# Patient Record
Sex: MALE | Race: WHITE | NOT HISPANIC OR LATINO | URBAN - METROPOLITAN AREA
[De-identification: names, ages, dates, MRNs, and addresses within clinical notes are randomized per-mention and may not be internally consistent; named-entity substitution may affect disease eponyms.]

---

## 2018-02-26 ENCOUNTER — OFFICE VISIT CONVERTED (OUTPATIENT)
Dept: FAMILY MEDICINE CLINIC | Facility: CLINIC | Age: 48
End: 2018-02-26
Attending: NURSE PRACTITIONER

## 2018-05-29 ENCOUNTER — OFFICE VISIT CONVERTED (OUTPATIENT)
Dept: FAMILY MEDICINE CLINIC | Facility: CLINIC | Age: 48
End: 2018-05-29
Attending: NURSE PRACTITIONER

## 2018-09-25 ENCOUNTER — OFFICE VISIT CONVERTED (OUTPATIENT)
Dept: FAMILY MEDICINE CLINIC | Facility: CLINIC | Age: 48
End: 2018-09-25
Attending: PHYSICIAN ASSISTANT

## 2018-09-27 ENCOUNTER — OFFICE VISIT CONVERTED (OUTPATIENT)
Dept: PODIATRY | Facility: CLINIC | Age: 48
End: 2018-09-27
Attending: PODIATRIST

## 2018-09-27 ENCOUNTER — CONVERSION ENCOUNTER (OUTPATIENT)
Dept: PODIATRY | Facility: CLINIC | Age: 48
End: 2018-09-27

## 2019-09-05 ENCOUNTER — HOSPITAL ENCOUNTER (OUTPATIENT)
Dept: LAB | Facility: HOSPITAL | Age: 49
Discharge: HOME | End: 2019-09-05
Attending: NURSE PRACTITIONER

## 2019-09-05 ENCOUNTER — HOSPITAL ENCOUNTER (OUTPATIENT)
Dept: GENERAL RADIOLOGY | Facility: HOSPITAL | Age: 49
Discharge: HOME | End: 2019-09-05
Attending: NURSE PRACTITIONER

## 2019-09-05 LAB
T4 FREE SERPL-MCNC: 1.1 NG/DL (ref 0.9–1.8)
TSH SERPL-ACNC: 1.97 M[IU]/L (ref 0.27–4.2)

## 2019-09-06 ENCOUNTER — HOSPITAL ENCOUNTER (OUTPATIENT)
Dept: GENERAL RADIOLOGY | Facility: HOSPITAL | Age: 49
Discharge: HOME | End: 2019-09-06
Attending: NURSE PRACTITIONER

## 2019-09-06 ENCOUNTER — OFFICE VISIT CONVERTED (OUTPATIENT)
Dept: FAMILY MEDICINE CLINIC | Facility: CLINIC | Age: 49
End: 2019-09-06
Attending: NURSE PRACTITIONER

## 2019-09-06 LAB
ALBUMIN SERPL-MCNC: 4.7 G/DL (ref 3.5–5)
ALBUMIN/GLOB SERPL: 1.6 {RATIO} (ref 1.4–2.6)
ALP SERPL-CCNC: 80 U/L (ref 53–128)
ALT SERPL-CCNC: 112 U/L (ref 10–40)
ANION GAP SERPL CALC-SCNC: 24 MMOL/L (ref 8–19)
AST SERPL-CCNC: 39 U/L (ref 15–50)
BILIRUB SERPL-MCNC: 0.58 MG/DL (ref 0.2–1.3)
BUN SERPL-MCNC: 18 MG/DL (ref 5–25)
BUN/CREAT SERPL: 21 {RATIO} (ref 6–20)
CALCIUM SERPL-MCNC: 9.2 MG/DL (ref 8.7–10.4)
CHLORIDE SERPL-SCNC: 101 MMOL/L (ref 99–111)
CHOLEST SERPL-MCNC: 201 MG/DL (ref 107–200)
CHOLEST/HDLC SERPL: 5.4 {RATIO} (ref 3–6)
CONV CO2: 19 MMOL/L (ref 22–32)
CONV TOTAL PROTEIN: 7.6 G/DL (ref 6.3–8.2)
CREAT UR-MCNC: 0.85 MG/DL (ref 0.7–1.2)
GFR SERPLBLD BASED ON 1.73 SQ M-ARVRAT: >60 ML/MIN/{1.73_M2}
GLOBULIN UR ELPH-MCNC: 2.9 G/DL (ref 2–3.5)
GLUCOSE SERPL-MCNC: 135 MG/DL (ref 70–99)
HDLC SERPL-MCNC: 37 MG/DL (ref 40–60)
LDLC SERPL CALC-MCNC: 145 MG/DL (ref 70–100)
OSMOLALITY SERPL CALC.SUM OF ELEC: 294 MOSM/KG (ref 273–304)
POTASSIUM SERPL-SCNC: 4.2 MMOL/L (ref 3.5–5.3)
PSA SERPL-MCNC: 0.55 NG/ML (ref 0–4)
SODIUM SERPL-SCNC: 140 MMOL/L (ref 135–147)
TRIGL SERPL-MCNC: 93 MG/DL (ref 40–150)
VLDLC SERPL-MCNC: 19 MG/DL (ref 5–37)

## 2019-09-09 ENCOUNTER — HOSPITAL ENCOUNTER (OUTPATIENT)
Dept: ULTRASOUND IMAGING | Facility: HOSPITAL | Age: 49
Discharge: HOME | End: 2019-09-09
Attending: NURSE PRACTITIONER

## 2019-10-18 ENCOUNTER — OFFICE VISIT CONVERTED (OUTPATIENT)
Dept: FAMILY MEDICINE CLINIC | Facility: CLINIC | Age: 49
End: 2019-10-18
Attending: NURSE PRACTITIONER

## 2020-02-10 ENCOUNTER — OFFICE VISIT CONVERTED (OUTPATIENT)
Dept: CARDIOLOGY | Facility: CLINIC | Age: 50
End: 2020-02-10
Attending: INTERNAL MEDICINE

## 2020-02-14 ENCOUNTER — CONVERSION ENCOUNTER (OUTPATIENT)
Dept: CARDIOLOGY | Facility: CLINIC | Age: 50
End: 2020-02-14
Attending: INTERNAL MEDICINE

## 2020-10-01 ENCOUNTER — OFFICE VISIT CONVERTED (OUTPATIENT)
Dept: FAMILY MEDICINE CLINIC | Facility: CLINIC | Age: 50
End: 2020-10-01
Attending: PHYSICIAN ASSISTANT

## 2020-10-01 ENCOUNTER — HOSPITAL ENCOUNTER (OUTPATIENT)
Dept: LAB | Facility: HOSPITAL | Age: 50
Discharge: HOME | End: 2020-10-01
Attending: PHYSICIAN ASSISTANT

## 2020-10-01 LAB
ALBUMIN SERPL-MCNC: 4.5 G/DL (ref 3.5–5)
ALBUMIN/GLOB SERPL: 1.8 {RATIO} (ref 1.4–2.6)
ALP SERPL-CCNC: 78 U/L (ref 56–119)
ALT SERPL-CCNC: 107 U/L (ref 10–40)
ANION GAP SERPL CALC-SCNC: 22 MMOL/L (ref 8–19)
AST SERPL-CCNC: 41 U/L (ref 15–50)
BASOPHILS # BLD AUTO: 0.02 10*3/UL (ref 0–0.2)
BASOPHILS NFR BLD AUTO: 0.3 % (ref 0–3)
BILIRUB SERPL-MCNC: 0.42 MG/DL (ref 0.2–1.3)
BUN SERPL-MCNC: 20 MG/DL (ref 5–25)
BUN/CREAT SERPL: 20 {RATIO} (ref 6–20)
CALCIUM SERPL-MCNC: 8.9 MG/DL (ref 8.7–10.4)
CHLORIDE SERPL-SCNC: 106 MMOL/L (ref 99–111)
CHOLEST SERPL-MCNC: 199 MG/DL (ref 107–200)
CHOLEST/HDLC SERPL: 5.7 {RATIO} (ref 3–6)
CONV ABS IMM GRAN: 0.04 10*3/UL (ref 0–0.2)
CONV CO2: 18 MMOL/L (ref 22–32)
CONV IMMATURE GRAN: 0.5 % (ref 0–1.8)
CONV TOTAL PROTEIN: 7 G/DL (ref 6.3–8.2)
CREAT UR-MCNC: 0.98 MG/DL (ref 0.7–1.2)
DEPRECATED RDW RBC AUTO: 40.8 FL (ref 35.1–43.9)
EOSINOPHIL # BLD AUTO: 0.12 10*3/UL (ref 0–0.7)
EOSINOPHIL # BLD AUTO: 1.6 % (ref 0–7)
ERYTHROCYTE [DISTWIDTH] IN BLOOD BY AUTOMATED COUNT: 12.6 % (ref 11.6–14.4)
FOLATE SERPL-MCNC: >20 NG/ML (ref 4.8–20)
GFR SERPLBLD BASED ON 1.73 SQ M-ARVRAT: >60 ML/MIN/{1.73_M2}
GLOBULIN UR ELPH-MCNC: 2.5 G/DL (ref 2–3.5)
GLUCOSE SERPL-MCNC: 110 MG/DL (ref 70–99)
HCT VFR BLD AUTO: 48.2 % (ref 42–52)
HDLC SERPL-MCNC: 35 MG/DL (ref 40–60)
HGB BLD-MCNC: 16.2 G/DL (ref 14–18)
LDLC SERPL CALC-MCNC: 129 MG/DL (ref 70–100)
LYMPHOCYTES # BLD AUTO: 1.73 10*3/UL (ref 1–5)
LYMPHOCYTES NFR BLD AUTO: 23.3 % (ref 20–45)
MCH RBC QN AUTO: 29.6 PG (ref 27–31)
MCHC RBC AUTO-ENTMCNC: 33.6 G/DL (ref 33–37)
MCV RBC AUTO: 88.1 FL (ref 80–96)
MONOCYTES # BLD AUTO: 0.62 10*3/UL (ref 0.2–1.2)
MONOCYTES NFR BLD AUTO: 8.4 % (ref 3–10)
NEUTROPHILS # BLD AUTO: 4.88 10*3/UL (ref 2–8)
NEUTROPHILS NFR BLD AUTO: 65.9 % (ref 30–85)
NRBC CBCN: 0 % (ref 0–0.7)
OSMOLALITY SERPL CALC.SUM OF ELEC: 297 MOSM/KG (ref 273–304)
PLATELET # BLD AUTO: 242 10*3/UL (ref 130–400)
PMV BLD AUTO: 10.2 FL (ref 9.4–12.4)
POTASSIUM SERPL-SCNC: 4 MMOL/L (ref 3.5–5.3)
PSA SERPL-MCNC: 0.68 NG/ML (ref 0–4)
RBC # BLD AUTO: 5.47 10*6/UL (ref 4.7–6.1)
SODIUM SERPL-SCNC: 142 MMOL/L (ref 135–147)
TRIGL SERPL-MCNC: 173 MG/DL (ref 40–150)
TSH SERPL-ACNC: 1.27 M[IU]/L (ref 0.27–4.2)
VIT B12 SERPL-MCNC: 589 PG/ML (ref 211–911)
VLDLC SERPL-MCNC: 35 MG/DL (ref 5–37)
WBC # BLD AUTO: 7.41 10*3/UL (ref 4.8–10.8)

## 2020-11-02 ENCOUNTER — HOSPITAL ENCOUNTER (OUTPATIENT)
Dept: LAB | Facility: HOSPITAL | Age: 50
Discharge: HOME | End: 2020-11-02
Attending: PHYSICIAN ASSISTANT

## 2020-11-02 ENCOUNTER — OFFICE VISIT CONVERTED (OUTPATIENT)
Dept: FAMILY MEDICINE CLINIC | Facility: CLINIC | Age: 50
End: 2020-11-02
Attending: PHYSICIAN ASSISTANT

## 2020-11-02 ENCOUNTER — CONVERSION ENCOUNTER (OUTPATIENT)
Dept: FAMILY MEDICINE CLINIC | Facility: CLINIC | Age: 50
End: 2020-11-02

## 2020-11-02 LAB
ALBUMIN SERPL-MCNC: 4.3 G/DL (ref 3.5–5)
ALBUMIN/GLOB SERPL: 1.7 {RATIO} (ref 1.4–2.6)
ALP SERPL-CCNC: 79 U/L (ref 56–119)
ALT SERPL-CCNC: 120 U/L (ref 10–40)
ANION GAP SERPL CALC-SCNC: 16 MMOL/L (ref 8–19)
AST SERPL-CCNC: 46 U/L (ref 15–50)
BILIRUB SERPL-MCNC: 0.39 MG/DL (ref 0.2–1.3)
BUN SERPL-MCNC: 17 MG/DL (ref 5–25)
BUN/CREAT SERPL: 20 {RATIO} (ref 6–20)
CALCIUM SERPL-MCNC: 8.7 MG/DL (ref 8.7–10.4)
CHLORIDE SERPL-SCNC: 105 MMOL/L (ref 99–111)
CONV CO2: 22 MMOL/L (ref 22–32)
CONV TOTAL PROTEIN: 6.8 G/DL (ref 6.3–8.2)
CREAT UR-MCNC: 0.86 MG/DL (ref 0.7–1.2)
EST. AVERAGE GLUCOSE BLD GHB EST-MCNC: 131 MG/DL
GFR SERPLBLD BASED ON 1.73 SQ M-ARVRAT: >60 ML/MIN/{1.73_M2}
GLOBULIN UR ELPH-MCNC: 2.5 G/DL (ref 2–3.5)
GLUCOSE SERPL-MCNC: 107 MG/DL (ref 70–99)
HBA1C MFR BLD: 6.2 % (ref 3.5–5.7)
OSMOLALITY SERPL CALC.SUM OF ELEC: 290 MOSM/KG (ref 273–304)
POTASSIUM SERPL-SCNC: 4.2 MMOL/L (ref 3.5–5.3)
SODIUM SERPL-SCNC: 139 MMOL/L (ref 135–147)

## 2020-11-11 ENCOUNTER — OFFICE VISIT CONVERTED (OUTPATIENT)
Dept: PODIATRY | Facility: CLINIC | Age: 50
End: 2020-11-11
Attending: PODIATRIST

## 2020-12-18 ENCOUNTER — OFFICE VISIT CONVERTED (OUTPATIENT)
Dept: PODIATRY | Facility: CLINIC | Age: 50
End: 2020-12-18
Attending: PODIATRIST

## 2021-01-06 ENCOUNTER — OFFICE VISIT CONVERTED (OUTPATIENT)
Dept: SURGERY | Facility: CLINIC | Age: 51
End: 2021-01-06
Attending: NURSE PRACTITIONER

## 2021-01-29 ENCOUNTER — HOSPITAL ENCOUNTER (OUTPATIENT)
Dept: LAB | Facility: HOSPITAL | Age: 51
Discharge: HOME | End: 2021-01-29
Attending: PSYCHIATRY & NEUROLOGY

## 2021-01-29 ENCOUNTER — OFFICE VISIT CONVERTED (OUTPATIENT)
Dept: NEUROLOGY | Facility: CLINIC | Age: 51
End: 2021-01-29
Attending: PSYCHIATRY & NEUROLOGY

## 2021-01-29 LAB
ALBUMIN SERPL-MCNC: 4.4 G/DL (ref 3.5–5)
ALBUMIN/GLOB SERPL: 1.6 {RATIO} (ref 1.4–2.6)
ALP SERPL-CCNC: 81 U/L (ref 56–119)
ALT SERPL-CCNC: 66 U/L (ref 10–40)
ANION GAP SERPL CALC-SCNC: 15 MMOL/L (ref 8–19)
AST SERPL-CCNC: 25 U/L (ref 15–50)
BILIRUB SERPL-MCNC: 0.44 MG/DL (ref 0.2–1.3)
BUN SERPL-MCNC: 17 MG/DL (ref 5–25)
BUN/CREAT SERPL: 19 {RATIO} (ref 6–20)
CALCIUM SERPL-MCNC: 9.1 MG/DL (ref 8.7–10.4)
CHLORIDE SERPL-SCNC: 103 MMOL/L (ref 99–111)
CONV CO2: 24 MMOL/L (ref 22–32)
CONV TOTAL PROTEIN: 7.1 G/DL (ref 6.3–8.2)
CREAT UR-MCNC: 0.91 MG/DL (ref 0.7–1.2)
ERYTHROCYTE [SEDIMENTATION RATE] IN BLOOD: 3 MM/H (ref 0–20)
GFR SERPLBLD BASED ON 1.73 SQ M-ARVRAT: >60 ML/MIN/{1.73_M2}
GLOBULIN UR ELPH-MCNC: 2.7 G/DL (ref 2–3.5)
GLUCOSE SERPL-MCNC: 90 MG/DL (ref 70–99)
OSMOLALITY SERPL CALC.SUM OF ELEC: 287 MOSM/KG (ref 273–304)
POTASSIUM SERPL-SCNC: 3.7 MMOL/L (ref 3.5–5.3)
SODIUM SERPL-SCNC: 138 MMOL/L (ref 135–147)

## 2021-01-30 LAB
EST. AVERAGE GLUCOSE BLD GHB EST-MCNC: 111 MG/DL
HBA1C MFR BLD: 5.5 % (ref 3.5–5.7)

## 2021-02-01 LAB
ALBUMIN SERPL-MCNC: 3.5 G/DL (ref 2.9–4.4)
ALBUMIN/GLOB SERPL: 1.2 {RATIO} (ref 0.7–1.7)
ALPHA2 GLOB SERPL ELPH-MCNC: 0.8 G/DL (ref 0.4–1)
BETA GLOBULIN: 1.2 G/DL (ref 0.7–1.3)
CONV ALPHA-1-GLOBULIN: 0.2 G/DL (ref 0–0.4)
CONV HEPATITIS B SURFACE AG W CONFIRMATION RE: NEGATIVE
CONV HEPATITIS COMMENT: NORMAL
CONV IMMUNOGLOBULIN G (IGG): 774 MG/DL (ref 603–1613)
CONV IMMUNOGLOBULIN M (IGM): 48 MG/DL (ref 20–172)
CONV PE INTERPRETATION: NORMAL
CONV PE NOTE: NORMAL
CONV RHEUMATOID FACTOR IGA: 1 UNITS (ref 0–6)
CONV RHEUMATOID FACTOR IGG: 3 UNITS (ref 0–6)
CONV RHEUMATOID FACTOR IGM: 0 UNITS (ref 0–6)
CONV TOTAL PROTEIN: 6.5 G/DL (ref 6–8.5)
GAMMA GLOB SERPL ELPH-MCNC: 0.8 G/DL (ref 0.4–1.8)
GLOBULIN UR ELPH-MCNC: 3 G/DL (ref 2.2–3.9)
HBV CORE AB SER DONR QL IA: NEGATIVE
HBV CORE IGM SERPL QL IA: NEGATIVE
HBV E AB SERPL QL IA: NEGATIVE
HBV E AG SERPL QL IA: NEGATIVE
HBV SURFACE AB SER QL: NON REACTIVE
HCV AB S/CO SERPL IA: <0.1 S/CO RATIO (ref 0–0.9)
IGA SERPL-MCNC: 314 MG/DL (ref 90–386)
M-SPIKE: NORMAL G/DL
PROT PATTERN SERPL IFE-IMP: NORMAL

## 2021-02-02 LAB
ARSENIC BLD-MCNC: 7 UG/L (ref 2–23)
LEAD BLD-MCNC: <1 UG/DL (ref 0–4)
MERCURY BLD-MCNC: 1.8 UG/L (ref 0–14.9)

## 2021-02-03 ENCOUNTER — OFFICE VISIT CONVERTED (OUTPATIENT)
Dept: FAMILY MEDICINE CLINIC | Facility: CLINIC | Age: 51
End: 2021-02-03
Attending: PHYSICIAN ASSISTANT

## 2021-02-03 LAB
DSDNA AB SER-ACNC: NEGATIVE [IU]/ML
ENA AB SER IA-ACNC: NEGATIVE {RATIO}

## 2021-02-04 LAB — CONV PARANEOPLASTIC REFLEXIVE PANEL: NORMAL

## 2021-02-16 LAB
B BURGDOR18KD IGG SER QL IB: ABNORMAL
B BURGDOR23KD IGG SER QL IB: ABNORMAL
B BURGDOR23KD IGM SER QL IB: ABNORMAL
B BURGDOR28KD IGG SER QL IB: ABNORMAL
B BURGDOR30KD IGG SER QL IB: ABNORMAL
B BURGDOR39KD IGG SER QL IB: ABNORMAL
B BURGDOR39KD IGM SER QL IB: ABNORMAL
B BURGDOR41KD IGG SER QL IB: PRESENT
B BURGDOR41KD IGM SER QL IB: ABNORMAL
B BURGDOR45KD IGG SER QL IB: ABNORMAL
B BURGDOR58KD IGG SER QL IB: ABNORMAL
B BURGDOR66KD IGG SER QL IB: ABNORMAL
B BURGDOR93KD IGG CSF QL IB: ABNORMAL
CONV LYME IGG LINE BLOT INTERPRETATION: NEGATIVE
CONV LYME IGM LINE BLOT INTERPRETATION: NEGATIVE

## 2021-03-01 ENCOUNTER — HOSPITAL ENCOUNTER (OUTPATIENT)
Dept: GASTROENTEROLOGY | Facility: HOSPITAL | Age: 51
Setting detail: HOSPITAL OUTPATIENT SURGERY
Discharge: HOME | End: 2021-03-01
Attending: SURGERY

## 2021-03-08 ENCOUNTER — OFFICE VISIT CONVERTED (OUTPATIENT)
Dept: NEUROLOGY | Facility: CLINIC | Age: 51
End: 2021-03-08
Attending: PSYCHIATRY & NEUROLOGY

## 2021-03-22 ENCOUNTER — HOSPITAL ENCOUNTER (OUTPATIENT)
Dept: MRI IMAGING | Facility: HOSPITAL | Age: 51
Discharge: HOME | End: 2021-03-22
Attending: PSYCHIATRY & NEUROLOGY

## 2021-04-14 ENCOUNTER — CONVERSION ENCOUNTER (OUTPATIENT)
Dept: NEUROLOGY | Facility: CLINIC | Age: 51
End: 2021-04-14

## 2021-04-14 ENCOUNTER — OFFICE VISIT CONVERTED (OUTPATIENT)
Dept: NEUROLOGY | Facility: CLINIC | Age: 51
End: 2021-04-14
Attending: PSYCHIATRY & NEUROLOGY

## 2021-04-27 ENCOUNTER — HOSPITAL ENCOUNTER (OUTPATIENT)
Dept: CT IMAGING | Facility: HOSPITAL | Age: 51
Discharge: HOME | End: 2021-04-27
Attending: PSYCHIATRY & NEUROLOGY

## 2021-04-27 LAB
CREAT BLD-MCNC: 1 MG/DL (ref 0.6–1.4)
GFR SERPLBLD BASED ON 1.73 SQ M-ARVRAT: >60 ML/MIN/{1.73_M2}

## 2021-05-10 NOTE — H&P
History and Physical      Patient Name: Carlos Mcgregor   Patient ID: [id number]   Sex: Male   YOB: 1970    Primary Care Provider: Dania RATLIFF   Referring Provider: Dania RATLIFF    Visit Date: November 11, 2020    Provider: Ambrosio Haynes DPM   Location: [facility]   Location Address: [address] [place] [address]  [id number]   Location Phone: [phone]          Chief Complaint  · Bilateral Foot Pain      History Of Present Illness  Carlos Mcgregor is a 50 year old /White male who presents to the [facility] today new patient referred from Dania RATLIFF.      New, Established, New Problem:  new  Location:  b/l lower legs  Duration:  2016  Onset:  insidious  Nature:  numbness  Stable, worsening, improving:  slowly worsening    Aggravating factors:   difficulty walking  Previous Treatment:  none    Patient denies any fevers, chills, nausea, vomiting, shortness of breathe, nor any other constitutional signs nor symptoms.         Past Medical History  Allergic rhinitis due to allergen; Arthritis; Elevated glucose; Elevated LFTs; Essential hypertension; Foot pain, left; Foreign body (FB) in soft tissue; GERD; GERD (gastroesophageal reflux disease); Heel pain; Hyperlipidemia; Hypertension; Ingrowing toenail; Numbness in feet         Past Surgical History  *Denies any surgical procedures         Medication List  Cozaar 100 mg oral tablet; montelukast 10 mg oral tablet; omeprazole 20 mg oral capsule,delayed release(DR/EC); Toprol XL 25 mg oral tablet extended release 24 hr         Allergy List  NO KNOWN DRUG ALLERGIES       Allergies Reconciled  Family Medical History  Heart Disease; Diabetes, unspecified type; Hypertension         Social History  Active but no formal exercise; Alcohol (Current some day); ; No known infection risk; Tobacco (Never)         Immunizations  Name Date Admin   Influenza 10/01/2020    Influenza 10/18/2019   Influenza 12/02/2015   Influenza 10/01/2014   Tdap 09/25/2018   Tdap 06/01/2007         Review of Systems  · Constitutional  o Denies  o : fatigue, night sweats  · Eyes  o Denies  o : double vision, blurred vision  · HENT  o Denies  o : vertigo, recent head injury  · Cardiovascular  o Denies  o : chest pain, irregular heart beats  · Respiratory  o Denies  o : shortness of breath, productive cough  · Gastrointestinal  o Denies  o : nausea, vomiting  · Genitourinary  o Denies  o : dysuria, urinary retention  · Integument  o Denies  o : hair growth change, new skin lesions  · Neurologic  o Admits  o : tingling or numbness  o Denies  o : altered mental status, seizures  · Musculoskeletal  o Denies  o : joint swelling, limitation of motion  · Endocrine  o Denies  o : cold intolerance, heat intolerance  · Heme-Lymph  o Denies  o : petechiae, lymph node enlargement or tenderness  · Allergic-Immunologic  o Denies  o : frequent illnesses      Vitals  Date Time BP Position Site L\R Cuff Size HR RR TEMP (F) WT  HT  BMI kg/m2 BSA m2 O2 Sat FR L/min FiO2        11/11/2020 08:15 /86 Sitting    82 - R  97.3 256lbs 0oz 6'   34.72 2.43 94 %            Physical Examination  · Constitutional  o Appearance  o : well developed, well-nourished, no obvious deformities present  · Cardiovascular  o Peripheral Vascular System  o :   § Pedal Pulses  § : pulses 2 + and symmetrical  § Extremities  § : no edema in lower extremities  · Musculoskeletal  o General  o :   § General Musculoskeletal  § : Lower extremity muscle and strength and range of motion is equal and symmetrical bilaterally. The knees are noted to be normal in alignment. Ankle alignment and range of motion is notmral and foot structure is normal. Subtalar, metatarsal and metatarsal-phalangeal range of motion is noted to be within normal limits. The digits of both feet are in normal alignment. The gait is normal.  · Skin and Subcutaneous  Tissue  o General Inspection  o : Skin is noted to have normal texture and turgor, with no excrescences noted.   o Digits and Nails  o : The toenails are noted to be without disese.  · Neurologic  o Sensation  o : Sharp/dull sensation is diminished bilaterally. Monofilament sensation examination of the left foot is diminished. Monofilament sensation examination of the right foot is diminished.          Assessment  · Foot pain, bilateral       Pain in right foot     729.5/M79.671  Pain in left foot     729.5/M79.672  · Idiopathic neuropathy     355.9/G60.9      Plan  · Orders  o EMG/NCV of Lower Extremities Bilaterally (70199) - - 11/11/2020  · Medications  o Medications have been Reconciled  o Transition of Care or Provider Policy  · Instructions  o Discuss Findings: I have discussed the findings of this evaluation with the patient. The discussion included a complete verbal explanation of any changes in the examination results, diagnosis, and the current treatment plan. A schedule for future care needs was explained. If any questions should arise after returning home, I have encouraged the patient to feel free to contact Dr. Hayens. The patient states understanding and agreement with this plan.  o f/u appointment after EMG/NCV results are available.  o Patient to monitor for recurrence of symptoms and to contact Dr. Smith office for a follow-up appointment.  o Electronically Identified Patient Education Materials Provided Electronically            Electronically Signed by: Ambrosio Haynes DPM -Author on November 11, 2020 08:50:12 AM

## 2021-05-10 NOTE — H&P
History and Physical      Patient Name: Carlos Mcgregor   Patient ID: [id number]   Sex: Male   YOB: 1970    Primary Care Provider: Dania RATLIFF   Referring Provider: Dania RATLIFF    Visit Date: January 6, 2021    Provider: JORJE Cho   Location: [facility]   Location Address: [address] [place] [address]  [id number]   Location Phone: [phone]          Chief Complaint  · Requesting colonoscopy  · Age 50 or over  · Here today for a pre-surgical colon screening visit      History Of Present Illness  The patient is a 50 year old /White male presenting to the Surgical Specialist office on a referral from Dania RATLIFF.   Carlos Mcgregor needs to have a screening colonoscopy.   Patient states that they have not had a colonoscopy.   Patient currently complains of: no complaints   Patient Does not have family history of colon cancer.      Presents today on referral from Dania Monet for screening colonoscopy.  Patient denies any abdominal pain, diarrhea, or rectal bleeding.  Denies any family history of colorectal cancer.  No previous colonoscopy.       Past Medical History  Disease Name Date Onset Notes   Allergic rhinitis due to allergen 11/02/2020 --    Arthritis --  --    Elevated glucose 11/02/2020 --    Elevated LFTs --  --    Essential hypertension 11/02/2020 --    Foot pain, left 09/27/2018 --    Foreign body (FB) in soft tissue 09/27/2018 --    GERD --  --    GERD (gastroesophageal reflux disease) 11/02/2020 --    Heel pain --  --    High blood pressure --  --    Hyperlipidemia --  --    Hypertension --  --    Ingrowing toenail --  --    Numbness in feet --  --          Past Surgical History  Procedure Name Date Notes   *Denies any surgical procedures --  --          Medication List  Name Date Started Instructions   Cozaar 100 mg oral tablet 10/01/2020 1 po qd   montelukast 10 mg oral tablet 10/01/2020 TAKE 1  TABLET(10 MG) BY MOUTH EVERY DAY IN THE EVENING for 90 days   omeprazole 20 mg oral capsule,delayed release(/EC) 10/01/2020 take 1 capsule (20 mg) by oral route once daily 30 minutes to 1 hour before a meal for 90 days   Toprol XL 25 mg oral tablet extended release 24 hr 11/02/2020 take 1 tablet (25 mg) by oral route once daily for 90 days         Allergy List  Allergen Name Date Reaction Notes   NO KNOWN DRUG ALLERGIES --  --  --    Latex Exam Gloves --  --  --        Allergies Reconciled  Family Medical History  Disease Name Relative/Age Notes   Heart Disease  --    Diabetes, unspecified type Grandfather (maternal)/  Mother/   Mother; Grandfather (maternal)   Hypertension  --          Social History  Finding Status Start/Stop Quantity Notes   Active but no formal exercise --  --/-- --  --    Alcohol Current some day 19/-- Socially Socially    --  --/-- --  --    No known infection risk --  --/-- --  --    Tobacco Never --/-- --  --          Review of Systems  · Constitutional  o Denies  o : fever, chills  · Eyes  o Denies  o : yellowish discoloration of eyes  · HENT  o Denies  o : difficulty swallowing  · Cardiovascular  o Denies  o : chest pain, chest pain on exertion  · Respiratory  o Denies  o : shortness of breath  · Gastrointestinal  o Denies  o : nausea, vomiting, diarrhea, constipation  · Genitourinary  o Denies  o : abnormal color of urine  · Integument  o Denies  o : rash  · Neurologic  o Denies  o : tingling or numbness  · Musculoskeletal  o Denies  o : joint pain  · Endocrine  o Denies  o : weight gain, weight loss      Vitals  Date Time BP Position Site L\R Cuff Size HR RR TEMP (F) WT  HT  BMI kg/m2 BSA m2 O2 Sat FR L/min FiO2 HC       01/06/2021 09:39 AM       16  249lbs 9oz 6'   33.85 2.4             Physical Examination  · Constitutional  o Appearance  o : well developed, well-nourished, patient in no apparent distress  · Head and Face  o Head  o :   § Inspection  § : atraumatic,  normocephalic  o Face  o :   § Inspection  § : no facial lesions  · Eyes  o Conjunctivae  o : conjunctivae normal  o Sclerae  o : sclerae white  · Neck  o Inspection/Palpation  o : normal appearance, no masses or tenderness, trachea midline  · Respiratory  o Respiratory Effort  o : breathing unlabored  · Skin and Subcutaneous Tissue  o General Inspection  o : no lesions present, no areas of discoloration, skin turgor normal, texture normal  · Neurologic  o Mental Status Examination  o :   § Orientation  § : grossly oriented to person, place and time  § Attention  § : attention normal, concentration abilities normal  § Fund of Knowledge  § : fund of knowledge within normal limits, patient aware of current events  o Gait and Station  o : normal gait, able to stand without difficulty  · Psychiatric  o Judgement and Insight  o : judgment and insight intact  o Mood and Affect  o : mood normal, affect appropriate          Assessment  · Screening for colon cancer     V76.51/Z12.11    Problems Reconciled  Plan  · Orders  o Consent for Colonoscopy Screening-Possible risk/complications, benefits, and alternatives to surgical or invasive procedure have been explained to patient and/or legal guardian. -Patient has been evaluated and can tolerate anesthesia and/or sedation. Risks, benefits, and alternatives to anesthesia and sedation have been explained to patient and/or legal guardian. () - V76.51/Z12.11 - 03/01/2021  · Medications  o Medications have been Reconciled  o Transition of Care or Provider Policy  · Instructions  o Surgical Facility: [facility]  o Handouts Provided Pre-Procedure Instructions including date, time, and location of procedure.   o PLAN: Proceeed with colonoscopy. Patient understands risks/benefits and is willing to proceed.   o ***Surgical Orders***  o RISK AND BENEFITS:  o Given these options, the patient has verbally expressed an understanding of the risks of the surgery and finds  these risks acceptable. Will proceed with surgery as soon as possible.  o O.R. PREP: Per protocol   o IV: Per Anesthesia  o Please sign permit for: Colonoscopy with possible biopsies by Dr. Heaton.  o The above History and Physical Examination has been completed within 30 days of admission.  o ***Patient Status***  o Outpatient  o Follow up in the in the office post procedure.  o Electronically Identified Patient Education Materials Provided Electronically  · Disposition  o Call or Return if symptoms worsen or persist.            Electronically Signed by: JORJE Cho -Author on January 6, 2021 09:57:47 AM

## 2021-05-10 NOTE — H&P
History and Physical      Patient Name: Carlos Mcgregor   Patient ID: [id number]   Sex: Male   YOB: 1970    Primary Care Provider: Dania RATLIFF   Referring Provider: Ambrosio Haynes DPM    Visit Date: January 29, 2021    Provider: Alfonso Valentine MD   Location: [facility]   Location Address: [address] [place] [address]  [id number]   Location Phone: [phone]          Chief Complaint     New patient here to establish care for bilateral foot pain       History Of Present Illness  Carlos Mcgregor is a 50 year old /White male who presents today to [facility] today referred from Ambrosio Haynes DPM.      50-year-old man evaluated for numbness and tingling in his feet.  He states that the tingling was in his feet to his ankles for 10 years but did not pay attention to it until the last year when he started having numbness in his feet all the time.  He states that he has stubbed his right toe and not know he injured it.  He states that it is worse in the morning and it has a sensation of his feet being numb as though he is slipping on the floor.  The numbness to his calves area.  His hands are started to bother him in the fingertips all the time in the last 2 weeks.  He had laboratory work-up performed in November showing that he was prediabetic.  B12 and thyroid function testing as well as compressive metabolic profile was unremarkable except for elevated ALT.  He has no history of rheumatologic disease.  He drinks 1-2 bottles of wine on the weekends.  He is not a heavy drinker.  He has been drinking for 20 years.    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  He has history of hypertension, chronic cough, visual complaints when he is blood pressure is elevated.  There is no family history of neuropathy.  His mother has diabetes.    He states that he has problems with balance.  He is balance is being off now.       Past Medical  History  Allergic rhinitis due to allergen; Arthritis; Elevated glucose; Elevated LFTs; Essential hypertension; Foot pain, left; Foreign body (FB) in soft tissue; GERD; GERD (gastroesophageal reflux disease); Heel pain; High blood pressure; Hyperlipidemia; Hypertension; Ingrowing toenail; Numbness in feet         Past Surgical History  *Denies any surgical procedures         Medication List  Cozaar 100 mg oral tablet; montelukast 10 mg oral tablet; omeprazole 20 mg oral capsule,delayed release(DR/EC); Toprol XL 25 mg oral tablet extended release 24 hr         Allergy List  NO KNOWN DRUG ALLERGIES; Latex Exam Gloves       Allergies Reconciled  Family Medical History  Heart Disease; Diabetes, unspecified type; Hypertension         Social History  Active but no formal exercise; Alcohol (Current some day); ; No known infection risk; Tobacco (Never)         Immunizations  Name Date Admin   Influenza 10/01/2020   Influenza 10/18/2019   Influenza 12/02/2015   Influenza 10/01/2014   Tdap 09/25/2018   Tdap 06/01/2007         Review of Systems  · Constitutional  o Denies  o : chills, excessive sweating, fatigue, fever, sycope/passing out, weight gain, weight loss  · Eyes  o Denies  o : changes in vision, blurred vision, double vision  · HENT  o Admits  o : seasonal allergies  o Denies  o : hearing loss, ringing in the ears, ear aches, sore throat, nasal congestion, sinus pain, nose bleeds  · Cardiovascular  o Denies  o : blood clots, swollen legs, anemia, easy burising or bleeding, transfusions  · Respiratory  o Denies  o : shortness of breath, dry cough, productive cough, pneumonia, COPD  · Gastrointestinal  o Denies  o : dysphagia, reflux  · Genitourinary  o Denies  o : incontinence  · Neurologic  o Admits  o : tremor, dizziness/vertigo, numbness/tingling/paresthesia   o Denies  o : headache, seizure, stroke, loss of balance, falls, difficulty with sleep, difficulty with coordination, difficulty with dexterity,  weakness  · Musculoskeletal  o Denies  o : neck stiffness/pain, swollen lymph nodes, muscle aches, joint pain, weakness, spasms, sciatica, pain radiating in arm, pain radiating in leg, low back pain  · Endocrine  o Denies  o : diabetes, thyroid disorder  · Psychiatric  o Denies  o : anxiety, depression      Vitals  Date Time BP Position Site L\R Cuff Size HR RR TEMP (F) WT  HT  BMI kg/m2 BSA m2 O2 Sat FR L/min FiO2 HC       01/29/2021 09:24 /89 Sitting    89 - R  97.8 247lbs 9oz 6'   33.58 2.39             Physical Examination     He is alert, fluent, phasic, follows commands well.  Optic disks are normal bilaterally, visual fields are full to confrontation, EOMs full in all directions gaze, facial sensation symmetrical, facial strength is full, soft palate elevation and tongue are normal.  There is no weakness of the upper or lower extremities on individual muscle testing.  There is mild tremor noted finger-to-nose testing as he reaches the target.  There is no tremor noted on Archimedes spiral.  Reflexes are decreased in the biceps, triceps, normal in the patellar's and ankles.  Sensation is decreased to pinprick in a stocking distribution to the knee.  Vibration is impaired higher than the ankles.  Station gait is able to tiptoe, heel walk, giovanna and has difficulty with tandem.  Is able to stand up on 1 foot but has a hard time maintaining his balance.  Dorsalis pedis pulses are normal bilaterally.  The feet feels cold bilaterally.  Heart is regular rhythm normal in rate.           Assessment  · Neuropathy     729.2/G62.9  I discussed with him that he has neuropathy and most likely it is prediabetes. A neuropathy work-up will be initiated and he is to call our office to find out the results. I discussed with him that if the results are negative he needs to follow-up with his primary care provider and be treated for prediabetes.    I discussed with him that he needs to stop drinking alcohol. I discussed with  him the etiologies for peripheral neuropathy and what disease were looking for in the laboratory work-up.    He had a nerve conduction study performed by a physical therapist which shows axonal sensorimotor polyneuropathy.     He is aware that the treatment for prediabetes is to lose weight, watch his carbs and see if we can lower his hemoglobin A1c to normal levels.    Total time spent with patient coordinating patient care was 50 minutes.  · Numbness and tingling       Anesthesia of skin     782.0/R20.0  Paresthesia of skin     782.0/R20.2  · Prediabetes     790.29/R73.03      Plan  · Orders  o CMP [facility] (21831) - 729.2/G62.9, 782.0/R20.0, 782.0/R20.2, 790.29/R73.03 - 01/29/2021  o Hgb A1c [facility] (10767) - 729.2/G62.9, 782.0/R20.0, 782.0/R20.2, 790.29/R73.03 - 01/29/2021  o Serum electrophoresis (24381) - 729.2/G62.9, 782.0/R20.0, 782.0/R20.2, 790.29/R73.03 - 01/29/2021  o Immunofixation electrophoresis; serum (76432) - 729.2/G62.9, 782.0/R20.0, 782.0/R20.2, 790.29/R73.03 - 01/29/2021  o RASHAAD (antinuclear antibody profile) by enzyme immunoassay (77183) - 729.2/G62.9, 782.0/R20.0, 782.0/R20.2, 790.29/R73.03 - 01/29/2021  o ESR (77871) - 729.2/G62.9, 782.0/R20.0, 782.0/R20.2, 790.29/R73.03 - 01/29/2021  o Rheumatoid Factor IgG, IgM, and IgA [facility] (72159) - 729.2/G62.9, 782.0/R20.0, 782.0/R20.2, 790.29/R73.03 - 01/29/2021  o Hu antibody assay (80050) - 729.2/G62.9, 782.0/R20.0, 782.0/R20.2, 790.29/R73.03 - 01/29/2021  o Lyme disease IgM antibody assay by Western blot (95011) - 729.2/G62.9, 782.0/R20.0, 782.0/R20.2, 790.29/R73.03 - 01/29/2021  o Hepatitis B and C screen (07666) - 729.2/G62.9, 782.0/R20.0, 782.0/R20.2, 790.29/R73.03 - 01/29/2021  o Copper free ser (12990) - 729.2/G62.9, 782.0/R20.0, 782.0/R20.2, 790.29/R73.03 - 01/29/2021  o Heavy metals measurement (17679) - 729.2/G62.9, 782.0/R20.0, 782.0/R20.2, 790.29/R73.03 - 01/29/2021  · Medications  o Medications have been Reconciled  o Transition of Care or Provider  Policy  · Instructions  o Encouraged to follow-up with Primary Care Provider for preventative care.            Electronically Signed by: Alfonso Valentine MD -Author on January 29, 2021 10:22:05 AM

## 2021-05-13 NOTE — PROGRESS NOTES
Progress Note      Patient Name: Carlos Mcgregor   Patient ID: [id number]   Sex: Male   YOB: 1970    Primary Care Provider: Dania RATLIFF   Referring Provider: Dania RATLIFF    Visit Date: November 2, 2020    Provider: EVY Carmona   Location: [facility]   Location Address: [address] [place] [address], [address]  [place], [place]  [id number]   Location Phone: [phone]          Chief Complaint  · f/u - HTN      History Of Present Illness  Carlos Mcgregor is a 50 year old /White male who presents for evaluation and treatment of:      Pt is a f/u for HTN. Pt states the first couple weeks of checking BP, it ranged from 140-150/. Pt states he has not checked his BP for the last couple weeks. Pt states he noticed if his BP is elevated, he has to wear his reading glasses. He states he has not had to wear them recently. Pt also states his H/A have subsided in the past couple weeks.     Still with loss of feeling in bilateral feet; right greater than left. B12 was normal. Glucose is elevated at 110; will recheck with A1c today.    Also c/o cyclic abnormality of Right 4th digit in which cuticle overgrows then he will pull the cuticle, area gets red with clear discharge and the tip of his finger gets very tender and then it heals and the overgrowth starts again. Pt is requesting treatment.    AR: takes Singulair prn with good results    GERD: takes Omeprazole with good results    Pt is due colonoscopy; scheduled consult today for 11/18    Pt is due CMP and A1C.       Past Medical History  Disease Name Date Onset Notes   Elevated LFTs --  --    Foot pain, left 09/27/2018 --    Foreign body (FB) in soft tissue 09/27/2018 --    GERD --  --    Heel pain --  --    Hyperlipidemia --  --    Hypertension --  --    Ingrowing toenail --  --          Past Surgical History  Procedure Name Date Notes   *Denies any surgical procedures --  --          Medication  List  Name Date Started Instructions   Cozaar 100 mg oral tablet 10/01/2020 1 po qd   montelukast 10 mg oral tablet 10/01/2020 TAKE 1 TABLET(10 MG) BY MOUTH EVERY DAY IN THE EVENING for 90 days   omeprazole 20 mg oral capsule,delayed release(DR/EC) 10/01/2020 take 1 capsule (20 mg) by oral route once daily 30 minutes to 1 hour before a meal for 90 days   Toprol XL 25 mg oral tablet extended release 24 hr 10/01/2020 1/2 po QHS         Allergy List  Allergen Name Date Reaction Notes   NO KNOWN DRUG ALLERGIES --  --  --        Allergies Reconciled  Family Medical History  Disease Name Relative/Age Notes   Heart Disease  --    Diabetes, unspecified type Mother/   --    Hypertension  --          Social History  Finding Status Start/Stop Quantity Notes   Active but no formal exercise --  --/-- --  --    Alcohol Current some day 19/-- Socially Socially    --  --/-- --  --    No known infection risk --  --/-- --  --    Tobacco Never --/-- --  --          Immunizations  NameDate Admin Mfg Trade Name Lot Number Route Inj VIS Given VIS Publication   Fghvjkugl98/01/2020 [facility] Fluzone Quadrivalent [id number] IM RD 10/01/2020 08/15/2019   Comments: patient tolerated well & advised to wait 15min   Tdap09/25/2018 [facility] BOOSTRIX [id number] IM  09/25/2018 02/24/2015   Comments: ndc [id number]   Tdap06/01/2007 [facility] BOOSTRIX  NE NE 06/23/2015 01/24/2012   Comments:          Review of Systems  · Constitutional  o Denies  o : fever, fatigue, weight loss, weight gain  · Cardiovascular  o Denies  o : lower extremity edema, claudication, chest pressure, palpitations  · Respiratory  o Denies  o : shortness of breath, wheezing, cough, hemoptysis, dyspnea on exertion  · Gastrointestinal  o Denies  o : nausea, vomiting, diarrhea, constipation, abdominal pain  · Integument  o Admits  o : new skin lesions  · Neurologic  o Admits  o : tingling or numbness      Vitals  Date Time BP Position Site L\R Cuff Size HR RR TEMP (F) WT  HT  BMI kg/m2 BSA m2 O2  Sat FR L/min FiO2 HC       02/10/2020 02:34 /98 Sitting    104 - R   255lbs 0oz 6'   34.58 2.42       10/01/2020 11:31 /101 Sitting    82 - R  98.1 259lbs 6oz 6'   35.18 2.44 96 %  21%    11/02/2020 09:00 /82 Sitting    87 - R   261lbs 0oz 6'   35.4 2.45 97 %  21%          Physical Examination  · Constitutional  o Appearance  o : well developed, well-nourished, no acute distress  · Head and Face  o Head  o : normocephalic, atraumatic  · Neck  o Inspection/Palpation  o : normal appearance, no masses or tenderness, trachea midline  o Thyroid  o : gland size normal, nontender, no nodules or masses present on palpation  · Respiratory  o Respiratory Effort  o : breathing unlabored  o Inspection of Chest  o : chest rise symmetric bilaterally  o Auscultation of Lungs  o : clear to auscultation bilaterally throughout inspiration and expiration  · Cardiovascular  o Heart  o :   § Auscultation of Heart  § : regular rate and rhythm, no murmurs, gallops or rubs  o Peripheral Vascular System  o :   § Extremities  § : no edema  · Lymphatic  o Neck  o : no cervical lymphadenopathy, no supraclavicular lymphadenopathy  · Psychiatric  o Mood and Affect  o : mood normal, affect appropriate     Right 4th digit medial cuticle border with healing wound; no redness or drainage; tender to palpation               Assessment  · Screening for colon cancer     V76.51/Z12.11  · Allergic rhinitis due to allergen     477.9/J30.9  Continue with Singulair.  · Essential hypertension     401.9/I10  Increase Toprol XL 25mg to one whole tablet qd; continue with Cozaar  · GERD (gastroesophageal reflux disease)     530.81/K21.9  Continue Omeprazole  · Elevated glucose     790.29/R73.09  Recheck today with A1c  · Paresthesia of both feet     782.0/R20.2  Referral to podiatry for evaluation  · Finger erythema     695.9/L53.9  Pt to call for referral to derm with the cuticle has started to overgrow    Problems  Reconciled  Plan  · Orders  o COLONOSCOPY REFERRAL (COLON) - V76.51/Z12.11 - 11/02/2020  o ACO-39: Current medications updated and reviewed (1159F, ) - - 11/02/2020  o ACO-14: Influenza immunization administered or previously received [facility] () - - 11/02/2020  o PODIATRY CONSULTATION (PODIA) - 782.0/R20.2 - 11/02/2020  · Medications  o Toprol XL 25 mg oral tablet extended release 24 hr   SIG: take 1 tablet (25 mg) by oral route once daily for 90 days   DISP: (90) Tablet with 1 refills  Adjusted on 11/02/2020     o Medications have been Reconciled  o Transition of Care or Provider Policy  · Instructions  o Patient advised to monitor blood pressure (B/P) at home and journal readings. Patient informed that a B/P reading at home of more than 130/80 is considered hypertension. For readings greater pije186/90 or higher patient is advised to follow up in the office with readings for management. Patient advised to limit sodium intake.  o Maintain a healthy weight. Avoid tight fitting clothes. Avoid fried, fatty foods, tomato sauce, chocolate, mint, garlic, onion, alcohol. caffeine. Eat smaller meals, dont lie down after a meal, dont smoke. Elevate the head of your bed 6-9 inches.  o Take all medications as prescribed/directed.  o Patient was educated/instructed on their diagnosis, treatment and medications prior to discharge from the clinic today.  o Call the office with any concerns or questions.  o Chronic conditions reviewed and taken into consideration for today's treatment plan.  o Discussed Covid-19 precautions including, but not limited to, social distancing, avoid touching your face, and hand washing.   · Disposition  o Follow Up 3 months.            Electronically Signed by: EVY Carmona -Author on November 2, 2020 09:58:03 AM

## 2021-05-13 NOTE — PROGRESS NOTES
Progress Note      Patient Name: Carlos Mcgregor   Patient ID: [id number]   Sex: Male   YOB: 1970    Primary Care Provider: Dania RATLIFF   Referring Provider: Dania RATLIFF    Visit Date: October 1, 2020    Provider: EVY Carmona   Location: [facility]   Location Address: [address] [place] [address], [address]  [place], [place]  [id number]   Location Phone: [phone]          Chief Complaint  · follow up   · medication refills       History Of Present Illness  Carlos Mcgregor is a 50 year old /White male who presents for evaluation and treatment of:      Patient is here for follow up and medication refills     HTN: Patient is currently on Losartan and Toprol but states he has been out of medication for over 1 week. He states even on the medication his BP has been 140-160/. Today in clinic it is 185/100. He denies CP/SOA. He states since being out of medication he has been having headaches and has noticed some swelling in his feet. Patient has been off medication for 1 week.    Loss of feeling in feet during the past 1-2 months. Trauma to right great toes without knowing. Occassional low back pain. Does socially drink ETOH with wife on weekends (a few glasses of wine on Friday and Saturday)    AR: He takes singulair prn with good results     GERD: He takes Omeprazole with good results    Labs overdue       Past Medical History  Disease Name Date Onset Notes   Elevated LFTs --  --    Foot pain, left 09/27/2018 --    Foreign body (FB) in soft tissue 09/27/2018 --    GERD --  --    Heel pain --  --    Hyperlipidemia --  --    Hypertension --  --    Ingrowing toenail --  --          Past Surgical History  Procedure Name Date Notes   *Denies any surgical procedures --  --          Medication List  Name Date Started Instructions   Cozaar 100 mg oral tablet 10/01/2020 1 po qd   montelukast 10 mg oral tablet 10/01/2020 TAKE 1 TABLET(10 MG) BY MOUTH  EVERY DAY IN THE EVENING for 90 days   omeprazole 20 mg oral capsule,delayed release(/EC) 10/01/2020 take 1 capsule (20 mg) by oral route once daily 30 minutes to 1 hour before a meal for 90 days   Toprol XL 25 mg oral tablet extended release 24 hr 10/01/2020 1/2 po QHS         Allergy List  Allergen Name Date Reaction Notes   NO KNOWN DRUG ALLERGIES --  --  --        Allergies Reconciled  Family Medical History  Disease Name Relative/Age Notes   Heart Disease  --    Diabetes, unspecified type Mother/   --    Hypertension  --          Social History  Finding Status Start/Stop Quantity Notes   Active but no formal exercise --  --/-- --  --    Alcohol Current some day 21/-- Socially Socially    --  --/-- --  --    No known infection risk --  --/-- --  --    Tobacco Never --/-- --  --          Immunizations  NameDate Admin Mfg Trade Name Lot Number Route Inj VIS Given VIS Publication   Enbruzeoj53/01/2020 [facility] Fluzone Quadrivalent [id number] IM RD 10/01/2020 08/15/2019   Comments: patient tolerated well & advised to wait 15min   Tdap09/25/2018 [facility] BOOSTRIX [id number] IM  09/25/2018 02/24/2015   Comments: ndc [id number]   Tdap06/01/2007 [facility] BOOSTRIX  NE NE 06/23/2015 01/24/2012   Comments:          Review of Systems  · Constitutional  o Denies  o : fatigue, night sweats  · Eyes  o Denies  o : double vision, blurred vision  · HENT  o Admits  o : headaches  o Denies  o : vertigo, recent head injury  · Breasts  o Denies  o : abnormal changes in breast size, additional breast symptoms except as noted in the HPI  · Cardiovascular  o Denies  o : chest pain, irregular heart beats  · Respiratory  o Denies  o : shortness of breath, productive cough  · Gastrointestinal  o Denies  o : nausea, vomiting  · Genitourinary  o Denies  o : dysuria, urinary retention  · Integument  o Denies  o : hair growth change, new skin lesions  · Neurologic  o Admits  o : tingling or numbness  o Denies  o : altered mental status,  seizures  · Musculoskeletal  o Denies  o : joint swelling, limitation of motion  · Endocrine  o Denies  o : cold intolerance, heat intolerance  · Heme-Lymph  o Denies  o : petechiae, lymph node enlargement or tenderness  · Allergic-Immunologic  o Denies  o : frequent illnesses      Vitals  Date Time BP Position Site L\R Cuff Size HR RR TEMP (F) WT  HT  BMI kg/m2 BSA m2 O2 Sat FR L/min FiO2        10/01/2020 11:31 /101 Sitting    82 - R  98.1 259lbs 6oz 6'   35.18 2.44 96 %  21%          Physical Examination  · Constitutional  o Appearance  o : well developed, well-nourished, no acute distress  · Head and Face  o Head  o : normocephalic, atraumatic  · Ears, Nose, Mouth and Throat  o Ears  o :   § External Ears  § : external auditory canal appearance normal, no discharge present  § Otoscopic Examination  § : tympanic membranes pearly white/gray bilaterally  o Nose  o :   § External Nose  § : no lesions noted  § Nasopharynx  § : no discharge present  o Oral Cavity  o :   § Oral Mucosa  § : oral mucosa light pink  o Throat  o :   § Oropharynx  § : tonsils without exudate, no palatal petechiae  · Neck  o Inspection/Palpation  o : normal appearance, no masses or tenderness, trachea midline  o Thyroid  o : gland size normal, nontender, no nodules or masses present on palpation  · Respiratory  o Respiratory Effort  o : breathing unlabored  o Inspection of Chest  o : chest rise symmetric bilaterally  o Auscultation of Lungs  o : clear to auscultation bilaterally throughout inspiration and expiration  · Cardiovascular  o Heart  o :   § Auscultation of Heart  § : regular rate and rhythm, no murmurs, gallops or rubs  o Peripheral Vascular System  o :   § Extremities  § : no edema  · Lymphatic  o Neck  o : no cervical lymphadenopathy, no supraclavicular lymphadenopathy  · Psychiatric  o Mood and Affect  o : mood normal, affect appropriate     decreased sensation to bilateral feet; no lesions noted. No redness or  swelling.               Assessment  · Screening for depression     V79.0/Z13.89  · Need for influenza vaccination     V04.81/Z23  · Screening for prostate cancer     V76.44/Z12.5  · Allergic rhinitis due to allergen     477.9/J30.9  · Essential hypertension     401.9/I10  · GERD (gastroesophageal reflux disease)     530.81/K21.9  · Obesity     278.00/E66.9  · Paresthesia     782.0/R20.2       Restart bp medications and check labs; f/u in 1mth for recheck of bp and to review labs regarding paresthesia.     Problems Reconciled  Plan  · Orders  o Immunization Admin Fee (Single) ([facility]) (55355) -  - 10/01/2020  o Fluzone Quadrivalent Vaccine, age 6 months + (68532) -  - 10/01/2020   Vaccine - Influenza; Dose: 0.5; Site: Right Deltoid; Route: Intramuscular; Date: 10/01/2020 13:37:00; Exp: 2021; Lot: [id number]; Mfg: sanofi pasteur; TradeName: Fluzone Quadrivalent; Administered By: Nikki Stanton MA; Comment: patient tolerated well & advised to wait 15min  o ACO-14: Influenza immunization administered or previously received () -  - 10/01/2020  o Male Physical Primary Care Panel (CMP, CBC, TSH, Lipid, PSA) [facility] ([id number], 50762, 80925, 70043, 76893, ) - 401.9/I10, V76.44/Z12.5 - 10/01/2020  o ACO-39: Current medications updated and reviewed (, 1159F) - - 10/01/2020  o ACO-18: Positive screen for clinical depression using a standardized tool and a follow-up plan documented () - - 10/01/2020   off bp medication and not feeling well; will re-evaluate in 1 mth.  o ACO-14: Influenza immunization administered or previously received [facility] () - - 10/01/2020  o B12 Folate levels (B12FO) - 782.0/R20.2 - 10/01/2020  · Medications  o Cozaar 100 mg oral tablet   SI po qd   DISP: (90) Tablet with 1 refills  Refilled on 10/01/2020     o montelukast 10 mg oral tablet   SIG: TAKE 1 TABLET(10 MG) BY MOUTH EVERY DAY IN THE EVENING for 90 days   DISP: (90) Tablet with 1 refills  Refilled on  10/01/2020     o omeprazole 20 mg oral capsule,delayed release(DR/EC)   SIG: take 1 capsule (20 mg) by oral route once daily 30 minutes to 1 hour before a meal for 90 days   DISP: (90) Capsule with 1 refills  Refilled on 10/01/2020     o Toprol XL 25 mg oral tablet extended release 24 hr   SI/2 po QHS   DISP: (45) Tablet with 1 refills  Refilled on 10/01/2020     o Medications have been Reconciled  o Transition of Care or Provider Policy  · Instructions  o Depression Screen completed and scanned into the EMR under the designated folder within the patient's documents.  o Today's PHQ-9 result is 5  o Patient advised to monitor blood pressure (B/P) at home and journal readings. Patient informed that a B/P reading at home of more than 130/80 is considered hypertension. For readings greater kuye989/90 or higher patient is advised to follow up in the office with readings for management. Patient advised to limit sodium intake.  o Maintain a healthy weight. Avoid tight fitting clothes. Avoid fried, fatty foods, tomato sauce, chocolate, mint, garlic, onion, alcohol. caffeine. Eat smaller meals, dont lie down after a meal, dont smoke. Elevate the head of your bed 6-9 inches.  o Take all medications as prescribed/directed.  o Patient was educated/instructed on their diagnosis, treatment and medications prior to discharge from the clinic today.  o Call the office with any concerns or questions.  o Chronic conditions reviewed and taken into consideration for today's treatment plan.  o Discussed Covid-19 precautions including, but not limited to, social distancing, avoid touching your face, and hand washing.   o Electronically Identified Patient Education Materials Provided Electronically  · Disposition  o Call or Return if symptoms worsen or persist.  o Follow Up 1 month.            Electronically Signed by: EVY Carmona -Author on 2020 03:08:31 PM

## 2021-05-14 VITALS
WEIGHT: 259 LBS | SYSTOLIC BLOOD PRESSURE: 137 MMHG | OXYGEN SATURATION: 97 % | HEART RATE: 84 BPM | BODY MASS INDEX: 35.08 KG/M2 | DIASTOLIC BLOOD PRESSURE: 90 MMHG | TEMPERATURE: 97.9 F | HEIGHT: 72 IN

## 2021-05-14 VITALS
BODY MASS INDEX: 34.67 KG/M2 | OXYGEN SATURATION: 94 % | HEIGHT: 72 IN | WEIGHT: 256 LBS | SYSTOLIC BLOOD PRESSURE: 140 MMHG | DIASTOLIC BLOOD PRESSURE: 86 MMHG | TEMPERATURE: 97.3 F | HEART RATE: 82 BPM

## 2021-05-14 VITALS
OXYGEN SATURATION: 96 % | HEART RATE: 95 BPM | SYSTOLIC BLOOD PRESSURE: 137 MMHG | HEIGHT: 72 IN | TEMPERATURE: 97.6 F | WEIGHT: 248.12 LBS | DIASTOLIC BLOOD PRESSURE: 91 MMHG | BODY MASS INDEX: 33.61 KG/M2

## 2021-05-14 VITALS
HEIGHT: 72 IN | WEIGHT: 249 LBS | BODY MASS INDEX: 33.72 KG/M2 | HEART RATE: 70 BPM | DIASTOLIC BLOOD PRESSURE: 90 MMHG | SYSTOLIC BLOOD PRESSURE: 146 MMHG | RESPIRATION RATE: 18 BRPM

## 2021-05-14 VITALS
SYSTOLIC BLOOD PRESSURE: 185 MMHG | WEIGHT: 259.37 LBS | HEART RATE: 82 BPM | HEIGHT: 72 IN | DIASTOLIC BLOOD PRESSURE: 101 MMHG | TEMPERATURE: 98.1 F | BODY MASS INDEX: 35.13 KG/M2 | OXYGEN SATURATION: 96 %

## 2021-05-14 VITALS
TEMPERATURE: 97.8 F | WEIGHT: 247.56 LBS | DIASTOLIC BLOOD PRESSURE: 89 MMHG | BODY MASS INDEX: 33.53 KG/M2 | HEIGHT: 72 IN | SYSTOLIC BLOOD PRESSURE: 135 MMHG | HEART RATE: 89 BPM

## 2021-05-14 VITALS
BODY MASS INDEX: 35.35 KG/M2 | WEIGHT: 261 LBS | DIASTOLIC BLOOD PRESSURE: 82 MMHG | HEIGHT: 72 IN | HEART RATE: 87 BPM | OXYGEN SATURATION: 97 % | SYSTOLIC BLOOD PRESSURE: 142 MMHG

## 2021-05-14 VITALS
WEIGHT: 251 LBS | HEART RATE: 86 BPM | RESPIRATION RATE: 18 BRPM | HEIGHT: 72 IN | BODY MASS INDEX: 34 KG/M2 | SYSTOLIC BLOOD PRESSURE: 135 MMHG | DIASTOLIC BLOOD PRESSURE: 85 MMHG

## 2021-05-14 VITALS — BODY MASS INDEX: 33.8 KG/M2 | RESPIRATION RATE: 16 BRPM | WEIGHT: 249.56 LBS | HEIGHT: 72 IN

## 2021-05-14 NOTE — PROGRESS NOTES
Progress Note      Patient Name: Carlos Mcgregor   Patient ID: [id number]   Sex: Male   YOB: 1970    Primary Care Provider: Dania RATLIFF   Referring Provider: Ambrosio Haynes DPM    Visit Date: April 14, 2021    Provider: Alfonso Valentine MD   Location: [facility]   Location Address: [address] [place] [address]  [id number]   Location Phone: [phone]          Chief Complaint     BUE and BLE numbness and tingling       History Of Present Illness  Carlos Mcgregor is a 50 year old /White male who presents today to [facility] today referred from Ambrosio Haynes DPM.      50-year-old man here for follow-up of his MRI of the brain which shows a lacunar infarction in the left taya.  He is taking baby aspirin.  He has hypercholesterolemia and hypertension.  I discussed with him that his laboratory work-up for his neuropathy shows that he has borderline diabetes.  The rest of it is negative.  He had a nerve conduction study today showing severe axonal sensory neuropathy.       Past Medical History  Allergic rhinitis due to allergen; Arthritis; Elevated glucose; Elevated LFTs; Essential hypertension; Foot pain, left; Foreign body (FB) in soft tissue; GERD; GERD (gastroesophageal reflux disease); Heel pain; High blood pressure; Hyperlipidemia; Hypertension; Ingrowing toenail; Numbness in feet         Past Surgical History  *Denies any surgical procedures         Medication List  Cozaar 100 mg oral tablet; montelukast 10 mg oral tablet; omeprazole 20 mg oral capsule,delayed release(DR/EC); Toprol XL 25 mg oral tablet extended release 24 hr         Allergy List  NO KNOWN DRUG ALLERGIES; Latex Exam Gloves         Family Medical History  Heart Disease; Diabetes, unspecified type; Hypertension         Social History  Active but no formal exercise; Alcohol (Current some day); ; No known infection risk; Tobacco (Never)          Immunizations  Name Date Admin   Influenza 10/01/2020   Influenza 10/18/2019   Influenza 12/02/2015   Influenza 10/01/2014   Tdap 09/25/2018   Tdap 06/01/2007         Review of Systems  · Constitutional  o Denies  o : chills, excessive sweating, fatigue, fever, sycope/passing out, weight gain, weight loss  · Eyes  o Denies  o : changes in vision, blurry vision, double vision  · HENT  o Denies  o : loss of hearing, ringing in the ears, ear aches, sore throat, nasal congestion, sinus pain, nose bleeds, seasonal allergies  · Cardiovascular  o Denies  o : blood clots, swollen legs, anemia, easy burising or bleeding, transfusions  · Respiratory  o Denies  o : shortness of breath, dry cough, productive cough, pneumonia, COPD  · Gastrointestinal  o Denies  o : difficulty swallowing, reflux  · Genitourinary  o Denies  o : incontinence  · Neurologic  o Admits  o : stroke, difficulty with sleep, numbness/tingling/paresthesia   o Denies  o : headache, seizure, tremor, loss of balance, falls, dizziness/vertigo, difficulty with coordination, difficulty with dexterity, weakness  · Musculoskeletal  o Denies  o : neck stiffness/pain, swollen lymph nodes, muscle aches, joint pain, weakness, spasms, sciatica, pain radiating in arm, pain radiating in leg, low back pain  · Endocrine  o Denies  o : diabetes, thyroid disorder  · Psychiatric  o Denies  o : anxiety, depression      Vitals  Date Time BP Position Site L\R Cuff Size HR RR TEMP (F) WT  HT  BMI kg/m2 BSA m2 O2 Sat FR L/min FiO2        04/14/2021 08:14 /90 Sitting    70 - R 18  249lbs 0oz 6'   33.77 2.4             Physical Examination     Alert, fluent, phasic, follows commands well.  There is no weakness individual muscle testing.  Heart is regular rhythm normal in rate.           Assessment  · Peripheral neuropathy     356.9/G62.9  I discussed with him that EMG study is not clinically helpful and I gave him a choice of having it done or refusing it. He states  that since it is not likely helpful he is refusing at this time.  · Axonal neuropathy     355.9/G62.89  I discussed with him that his axonal neuropathy is most likely secondary to prediabetes. I will refer him to [facility] neuropathy clinic for second opinion. I will see him again in 3 months time for follow-up.  · Lacunar infarction     434.91/I63.81  I discussed with him that he had a stroke.I reviewed the images in the computer showing a lacunar infarction. I discussed with him that this is from hypertension and prediabetes. He is to follow-up with his primary care provider and take medications for hypercholesterolemia such as Lipitor 40 mg daily as well as the referred to nutrition for his diet and weight loss. He is to continue taking baby aspirin. I discussed with him regarding stroke and the role of antiplatelet agents.  I discussed with him regarding small vessel disease as the etiology for his lacunar infarction however I would like to do screening for large vessel disease and I will order CT angiogram of the head and neck. I discussed with him that he is going to be given iodinated contrast. He has no allergic reactions to this. He has no kidney problems.  Total time spent with patient coordinating patient care was 20 minutes.      Plan  · Orders  o NEUROLOGY CONSULTATION. (NEURO) - 355.9/G62.89, 434.91/I63.81 - 04/14/2021   [facility] neuropathy clinic second opinion  o CTA Head with IV Contrast [facility]; no Oral Prep (06069) - 355.9/G62.89, 434.91/I63.81 - 04/14/2021  o CTA Neck (Not Cervical Spine) without and with IV Contrast [facility]; no Oral Prep (92797) - 355.9/G62.89, 434.91/I63.81 - 04/14/2021  o Nerve conduction studies; 13 or more studies (49655) - 355.9/G62.89, 434.91/I63.81, 356.9/G62.9 - 04/14/2021  · Medications  o Medications have been Reconciled  o Transition of Care or Provider Policy  · Instructions  o Encouraged to follow-up with Primary Care Provider for preventative  care.            Electronically Signed by: Alfonso Valentine MD -Author on April 14, 2021 10:06:39 AM

## 2021-05-14 NOTE — PROGRESS NOTES
Progress Note      Patient Name: Carlos Mcgregor   Patient ID: [id number]   Sex: Male   YOB: 1970    Primary Care Provider: Dania RATLIFF   Referring Provider: Dania RATLIFF    Visit Date: December 18, 2020    Provider: Ambrosio Haynes DPM   Location: [facility]   Location Address: [address] [place] [address]  [id number]   Location Phone: [phone]          Chief Complaint  · Bilateral Foot Pain      History Of Present Illness  Carlos Mcgregor is a 50 year old /White male who presents to the [facility] today a follow up for:      New, Established, New Problem:  est  Location:  b/l lower legs  Duration:  2016  Onset:  insidious  Nature:  numbness  Stable, worsening, improving:  slowly worsening    Aggravating factors:   difficulty walking  Previous Treatment:  none    Patient denies any fevers, chills, nausea, vomiting, shortness of breathe, nor any other constitutional signs nor symptoms.    Patient relates no medical changes since their last visit.       Past Medical History  Allergic rhinitis due to allergen; Arthritis; Elevated glucose; Elevated LFTs; Essential hypertension; Foot pain, left; Foreign body (FB) in soft tissue; GERD; GERD (gastroesophageal reflux disease); Heel pain; Hyperlipidemia; Hypertension; Ingrowing toenail; Numbness in feet         Past Surgical History  *Denies any surgical procedures         Medication List  Cozaar 100 mg oral tablet; montelukast 10 mg oral tablet; omeprazole 20 mg oral capsule,delayed release(DR/EC); Toprol XL 25 mg oral tablet extended release 24 hr         Allergy List  NO KNOWN DRUG ALLERGIES       Allergies Reconciled  Family Medical History  Heart Disease; Diabetes, unspecified type; Hypertension         Social History  Active but no formal exercise; Alcohol (Current some day); ; No known infection risk; Tobacco (Never)         Immunizations  Name Date Admin   Influenza  10/01/2020   Influenza 10/18/2019   Influenza 12/02/2015   Influenza 10/01/2014   Tdap 09/25/2018   Tdap 06/01/2007         Review of Systems  · Constitutional  o Denies  o : fatigue, night sweats  · Eyes  o Denies  o : double vision, blurred vision  · HENT  o Denies  o : vertigo, recent head injury  · Cardiovascular  o Denies  o : chest pain, irregular heart beats  · Respiratory  o Denies  o : shortness of breath, productive cough  · Gastrointestinal  o Denies  o : nausea, vomiting  · Genitourinary  o Denies  o : dysuria, urinary retention  · Integument  o Denies  o : hair growth change, new skin lesions  · Neurologic  o Admits  o : tingling or numbness  o Denies  o : altered mental status, seizures  · Musculoskeletal  o Denies  o : joint swelling, limitation of motion  · Endocrine  o Denies  o : cold intolerance, heat intolerance  · Heme-Lymph  o Denies  o : petechiae, lymph node enlargement or tenderness  · Allergic-Immunologic  o Denies  o : frequent illnesses      Vitals  Date Time BP Position Site L\R Cuff Size HR RR TEMP (F) WT  HT  BMI kg/m2 BSA m2 O2 Sat FR L/min FiO2        12/18/2020 08:40 /90 Sitting    84 - R  97.9 259lbs 0oz 6'   35.13 2.44 97 %            Physical Examination  · Constitutional  o Appearance  o : well developed, well-nourished, no obvious deformities present  · Cardiovascular  o Peripheral Vascular System  o :   § Pedal Pulses  § : pulses 2 + and symmetrical  § Extremities  § : no edema in lower extremities  · Musculoskeletal  o General  o :   § General Musculoskeletal  § : Lower extremity muscle and strength and range of motion is equal and symmetrical bilaterally. The knees are noted to be normal in alignment. Ankle alignment and range of motion is notmral and foot structure is normal. Subtalar, metatarsal and metatarsal-phalangeal range of motion is noted to be within normal limits. The digits of both feet are in normal alignment. The gait is normal.  · Skin and Subcutaneous  Tissue  o General Inspection  o : Skin is noted to have normal texture and turgor, with no excrescences noted.   o Digits and Nails  o : The toenails are noted to be without disese.  · Neurologic  o Sensation  o : Sharp/dull sensation is diminished bilaterally. Monofilament sensation examination of the left foot is diminished. Monofilament sensation examination of the right foot is diminished.     EMG/NVC:  shows neuropathy.  See attached report for details.           Assessment  · Foot pain, bilateral       Pain in right foot     729.5/M79.671  Pain in left foot     729.5/M79.672  · Idiopathic neuropathy     355.9/G60.9      Plan  · Orders  o EMG/NCV of Lower Extremities Bilaterally (04541) - - 12/18/2020  o NEUROLOGY CONSULTATION. (NEURO) - - 12/18/2020  · Medications  o Medications have been Reconciled  o Transition of Care or Provider Policy  · Instructions  o Follow Up PRN.  o Discuss Findings: I have discussed the findings of this evaluation with the patient. The discussion included a complete verbal explanation of any changes in the examination results, diagnosis, and the current treatment plan. A schedule for future care needs was explained. If any questions should arise after returning home, I have encouraged the patient to feel free to contact Dr. Haynes. The patient states understanding and agreement with this plan.  o Patient to monitor for recurrence of symptoms and to contact Dr. Smith office for a follow-up appointment.  o Electronically Identified Patient Education Materials Provided Electronically  · Disposition  o Call or Return if symptoms worsen or persist.            Electronically Signed by: Ambrosio Haynes DPM -Author on December 18, 2020 09:47:52 AM

## 2021-05-14 NOTE — PROGRESS NOTES
Progress Note      Patient Name: Carlos Mcgregor   Patient ID: [id number]   Sex: Male   YOB: 1970    Primary Care Provider: Dania RATLIFF   Referring Provider: Ambrosio Haynes DPM    Visit Date: February 3, 2021    Provider: EVY Carmona   Location: [facility]   Location Address: [address] [place] [address], [address]  [place], [place]  [id number]   Location Phone: [phone]          Chief Complaint  · follow up   · medication refill       History Of Present Illness  Carlos Mcgregor is a 50 year old /White male who presents for evaluation and treatment of:      Patient is here for follow up and medication refills     HTN: Patient is taking Cozaar and Toprol and states he has been out of medication since Saturday. He does not check his BP at home on a regular basis. Today in office his BP is 137/91. He denies CP/SOA/Edema/Headaches but states he does have vision changes when it is high    AR: Patient takes Montelukast with good results     GERD: He is taking Omeprazole without breakthrough symptoms     Numbness of feet: podiatrist did nerve conduction that shows nerve loss therefore referred to Dr. Valentine who did labs. Numbness is spreading to hands and mouth.    Pt also has itching scalp and now are area of hair loss. No OTC treatment.    Lipid/CMP ordered and due 4/21.  A1C 5.5; Rheumatoid factor negative.    CLN is scheduled.           Past Medical History  Disease Name Date Onset Notes   Allergic rhinitis due to allergen 11/02/2020 --    Arthritis --  --    Elevated glucose 11/02/2020 --    Elevated LFTs --  --    Essential hypertension 11/02/2020 --    Foot pain, left 09/27/2018 --    Foreign body (FB) in soft tissue 09/27/2018 --    GERD --  --    GERD (gastroesophageal reflux disease) 11/02/2020 --    Heel pain --  --    High blood pressure --  --    Hyperlipidemia --  --    Hypertension --  --    Ingrowing toenail --  --    Numbness in feet --   --          Past Surgical History  Procedure Name Date Notes   *Denies any surgical procedures --  --          Medication List  Name Date Started Instructions   Cozaar 100 mg oral tablet 10/01/2020 1 po qd   montelukast 10 mg oral tablet 10/01/2020 TAKE 1 TABLET(10 MG) BY MOUTH EVERY DAY IN THE EVENING for 90 days   omeprazole 20 mg oral capsule,delayed release(DR/EC) 10/01/2020 take 1 capsule (20 mg) by oral route once daily 30 minutes to 1 hour before a meal for 90 days   Toprol XL 25 mg oral tablet extended release 24 hr 11/02/2020 take 1 tablet (25 mg) by oral route once daily for 90 days         Allergy List  Allergen Name Date Reaction Notes   NO KNOWN DRUG ALLERGIES --  --  --    Latex Exam Gloves --  --  --        Allergies Reconciled  Family Medical History  Disease Name Relative/Age Notes   Heart Disease  --    Diabetes, unspecified type Grandfather (maternal)/  Mother/   Mother; Grandfather (maternal)   Hypertension  --          Social History  Finding Status Start/Stop Quantity Notes   Active but no formal exercise --  --/-- --  --    Alcohol Current some day 21/-- Socially Socially    --  --/-- --  --    No known infection risk --  --/-- --  --    Tobacco Never --/-- --  --          Immunizations  NameDate Admin Mfg Trade Name Lot Number Route Inj VIS Given VIS Publication   Uyafgnkal58/01/2020 PMC Fluzone Quadrivalent [id number] IM RD 10/01/2020 08/15/2019   Comments: patient tolerated well & advised to wait 15min   Tdap09/25/2018 SKB BOOSTRIX [id number] IM  09/25/2018 02/24/2015   Comments: ndc [id number]   Tdap06/01/2007 SKB BOOSTRIX  NE NE 06/23/2015 01/24/2012   Comments:          Review of Systems  · Constitutional  o Denies  o : fever, fatigue, weight loss, weight gain  · Cardiovascular  o Denies  o : lower extremity edema, claudication, chest pressure, palpitations  · Respiratory  o Denies  o : shortness of breath, wheezing, cough, hemoptysis, dyspnea on  exertion  · Gastrointestinal  o Denies  o : nausea, vomiting, diarrhea, constipation, abdominal pain  · Integument  o Admits  o : itching, hair growth change  · Neurologic  o Admits  o : tingling or numbness      Vitals  Date Time BP Position Site L\R Cuff Size HR RR TEMP (F) WT  HT  BMI kg/m2 BSA m2 O2 Sat FR L/min FiO2 HC       02/03/2021 08:57 /91 Sitting    95 - R  97.6 248lbs 2oz 6'   33.65 2.39 96 %  21%          Physical Examination  · Constitutional  o Appearance  o : well developed, well-nourished, no acute distress  · Head and Face  o Head  o : normocephalic, atraumatic  · Neck  o Inspection/Palpation  o : normal appearance, no masses or tenderness, trachea midline  o Thyroid  o : gland size normal, nontender, no nodules or masses present on palpation  · Respiratory  o Respiratory Effort  o : breathing unlabored  o Inspection of Chest  o : chest rise symmetric bilaterally  o Auscultation of Lungs  o : clear to auscultation bilaterally throughout inspiration and expiration  · Cardiovascular  o Heart  o :   § Auscultation of Heart  § : regular rate and rhythm, no murmurs, gallops or rubs  o Peripheral Vascular System  o :   § Extremities  § : no edema  · Lymphatic  o Neck  o : no cervical lymphadenopathy, no supraclavicular lymphadenopathy  · Psychiatric  o Mood and Affect  o : mood normal, affect appropriate     Scalp red and inflamed; area of balding in back.           Assessment  · Allergic rhinitis due to allergen     477.9/J30.9  · GERD (gastroesophageal reflux disease)     530.81/K21.9  · Hyperlipidemia     272.4/E78.5  · Other long term (current) drug therapy     V58.69/Z79.899  · Elevated glucose     790.29/R73.09  · Hypertension     401.9/I10  · Paresthesia     782.0/R20.2  · Scalp itch     698.9/L29.9  Referral to derm.  · Alopecia     704.00/L65.9       Continue current medications; labs in 4/21. f/u in 6mth. Monitor bp at home. Continue with neurologist.     Problems  Reconciled  Plan  · Orders  o ACO-39: Current medications updated and reviewed (1159F, ) - - 2021  o DERMATOLOGY CONSULTATION (DERMA) - 698.9/L29.9, 704.00/L65.9 - 2021  · Medications  o Cozaar 100 mg oral tablet   SI po qd   DISP: (90) Tablet with 1 refills  Refilled on 2021     o montelukast 10 mg oral tablet   SIG: TAKE 1 TABLET(10 MG) BY MOUTH EVERY DAY IN THE EVENING for 90 days   DISP: (90) Tablet with 1 refills  Refilled on 2021     o omeprazole 20 mg oral capsule,delayed release(DR/EC)   SIG: take 1 capsule (20 mg) by oral route once daily 30 minutes to 1 hour before a meal for 90 days   DISP: (90) Capsule with 1 refills  Refilled on 2021     o Toprol XL 25 mg oral tablet extended release 24 hr   SIG: take 1 tablet (25 mg) by oral route once daily for 90 days   DISP: (90) Tablet with 1 refills  Refilled on 2021     o Medications have been Reconciled  o Transition of Care or Provider Policy  · Instructions  o Maintain a healthy weight. Avoid tight fitting clothes. Avoid fried, fatty foods, tomato sauce, chocolate, mint, garlic, onion, alcohol. caffeine. Eat smaller meals, dont lie down after a meal, dont smoke. Elevate the head of your bed 6-9 inches.  o Advised that cheeses and other sources of dairy fats, animal fats, fast food, and the extras (candy, pastries, pies, doughnuts and cookies) all contain LDL raising nutrients. Advised to increase fruits, vegetables, whole grains, and to monitor portion sizes.   o Take all medications as prescribed/directed.  o Patient was educated/instructed on their diagnosis, treatment and medications prior to discharge from the clinic today.  o Patient instructed to seek medical attention urgently for new or worsening symptoms.  o Call the office with any concerns or questions.  o Chronic conditions reviewed and taken into consideration for today's treatment plan.  o Electronically Identified Patient Education Materials Provided  Electronically  · Disposition  o Follow Up in 6 months.            Electronically Signed by: EVY Carmona -Author on February 3, 2021 09:32:34 AM

## 2021-05-15 VITALS
WEIGHT: 260 LBS | DIASTOLIC BLOOD PRESSURE: 86 MMHG | SYSTOLIC BLOOD PRESSURE: 138 MMHG | HEART RATE: 62 BPM | OXYGEN SATURATION: 96 % | BODY MASS INDEX: 35.21 KG/M2 | HEIGHT: 72 IN

## 2021-05-15 VITALS
HEART RATE: 109 BPM | BODY MASS INDEX: 34.61 KG/M2 | SYSTOLIC BLOOD PRESSURE: 166 MMHG | DIASTOLIC BLOOD PRESSURE: 99 MMHG | HEIGHT: 72 IN | WEIGHT: 255.5 LBS

## 2021-05-15 VITALS
HEART RATE: 104 BPM | SYSTOLIC BLOOD PRESSURE: 140 MMHG | BODY MASS INDEX: 34.54 KG/M2 | DIASTOLIC BLOOD PRESSURE: 98 MMHG | HEIGHT: 72 IN | WEIGHT: 255 LBS

## 2021-05-16 VITALS
WEIGHT: 250 LBS | HEART RATE: 83 BPM | HEIGHT: 73 IN | SYSTOLIC BLOOD PRESSURE: 140 MMHG | BODY MASS INDEX: 33.13 KG/M2 | OXYGEN SATURATION: 97 % | DIASTOLIC BLOOD PRESSURE: 102 MMHG

## 2021-05-16 VITALS
BODY MASS INDEX: 33.39 KG/M2 | SYSTOLIC BLOOD PRESSURE: 117 MMHG | WEIGHT: 246.5 LBS | HEART RATE: 101 BPM | DIASTOLIC BLOOD PRESSURE: 85 MMHG | HEIGHT: 72 IN

## 2021-05-16 VITALS
HEIGHT: 72 IN | HEART RATE: 90 BPM | WEIGHT: 246.12 LBS | SYSTOLIC BLOOD PRESSURE: 134 MMHG | TEMPERATURE: 98.2 F | DIASTOLIC BLOOD PRESSURE: 88 MMHG | OXYGEN SATURATION: 98 % | BODY MASS INDEX: 33.34 KG/M2

## 2021-05-16 VITALS
HEART RATE: 80 BPM | SYSTOLIC BLOOD PRESSURE: 165 MMHG | BODY MASS INDEX: 33.91 KG/M2 | HEIGHT: 72 IN | DIASTOLIC BLOOD PRESSURE: 104 MMHG | WEIGHT: 250.37 LBS

## 2021-06-14 ENCOUNTER — OFFICE VISIT (OUTPATIENT)
Dept: NEUROLOGY | Facility: CLINIC | Age: 51
End: 2021-06-14

## 2021-06-14 VITALS
HEART RATE: 86 BPM | HEIGHT: 72 IN | BODY MASS INDEX: 34.13 KG/M2 | TEMPERATURE: 97.8 F | DIASTOLIC BLOOD PRESSURE: 80 MMHG | WEIGHT: 252 LBS | SYSTOLIC BLOOD PRESSURE: 147 MMHG

## 2021-06-14 DIAGNOSIS — I63.81 LACUNAR INFARCTION (HCC): ICD-10-CM

## 2021-06-14 DIAGNOSIS — G62.9 POLYNEUROPATHY: Primary | ICD-10-CM

## 2021-06-14 PROCEDURE — 99215 OFFICE O/P EST HI 40 MIN: CPT | Performed by: PSYCHIATRY & NEUROLOGY

## 2021-06-14 RX ORDER — METOPROLOL SUCCINATE 25 MG/1
TABLET, EXTENDED RELEASE ORAL
COMMUNITY
Start: 2021-05-01 | End: 2021-06-23 | Stop reason: SDUPTHER

## 2021-06-14 RX ORDER — OMEPRAZOLE 20 MG/1
CAPSULE, DELAYED RELEASE ORAL
COMMUNITY
Start: 2021-04-01 | End: 2022-02-06

## 2021-06-14 RX ORDER — MONTELUKAST SODIUM 10 MG/1
TABLET ORAL
COMMUNITY
Start: 2021-04-30 | End: 2021-07-28

## 2021-06-14 RX ORDER — LOSARTAN POTASSIUM 100 MG/1
100 TABLET ORAL DAILY
COMMUNITY
Start: 2021-04-30 | End: 2021-07-23

## 2021-06-14 RX ORDER — FLUOCINONIDE TOPICAL SOLUTION USP, 0.05% 0.5 MG/ML
SOLUTION TOPICAL
COMMUNITY
Start: 2021-06-10 | End: 2022-02-06

## 2021-06-14 RX ORDER — DOXYCYCLINE 100 MG/1
100 CAPSULE ORAL DAILY
COMMUNITY
Start: 2021-06-10 | End: 2022-02-06

## 2021-06-14 NOTE — PROGRESS NOTES
"Chief Complaint  Follow-up    Subjective          Carlos Mcgregor is a 50 y.o. male who presents to [facility]  History of Present Illness  50-year-old man for follow-up of his neuropathy and his lacunar infarction.  CTA of the head and neck is unremarkable.  He states that he has had neuropathy for years and he is having cramping up symptoms in his left hand.  Nerve conduction shows that he has severe axonal neuropathy undetermined etiology.  Work-up has been negative.  He has an appointment with [facility] neuropathy clinic in August.  Objective   Vital Signs:   /80 (BP Location: Right arm, Patient Position: Sitting, Cuff Size: Adult)   Pulse 86   Temp 97.8 °F (36.6 °C)   Ht 182.9 cm (72\")   Wt 114 kg (252 lb)   BMI 34.18 kg/m²     Physical Exam   He is alert, fluent, phasic, follows commands well.        Assessment and Plan  Diagnoses and all orders for this visit:    1. Polyneuropathy (Primary)  Assessment & Plan:  I reviewed the work-up as well as the nerve conduction study and I discussed with him that I do not know the etiology of his neuropathy and therefore I am sending him to [facility] neuropathy clinic.  I discussed with him that if he wants it sooner he can contact the [facility] and they can get him usually within a month.  I discussed with him that the treatment for neuropathy is the underlying cause.  If it is prediabetes he needs to lose significant amount of weight at the guidance of his primary care.  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  I discussed with him that neuropathy clinic will try to figure out the etiology for his neuropathy since I am unable to even answer.  He will contact us after he has the visit from [facility] neuropathy clinic.         Total time spent with the patient and coordinating patient care was 40 minutes.    Follow Up  No " follow-ups on file.  Patient was given instructions and counseling regarding his condition or for health maintenance advice. Please see specific information pulled into the AVS if appropriate.

## 2021-06-14 NOTE — ASSESSMENT & PLAN NOTE
I reviewed the work-up as well as the nerve conduction study and I discussed with him that I do not know the etiology of his neuropathy and therefore I am sending him to [facility] neuropathy clinic.  I discussed with him that if he wants it sooner he can contact the [facility] and they can get him usually within a month.  I discussed with him that the treatment for neuropathy is the underlying cause.  If it is prediabetes he needs to lose significant amount of weight at the guidance of his primary care.  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  I discussed with him that neuropathy clinic will try to figure out the etiology for his neuropathy since I am unable to even answer.  He will contact us after he has the visit from [facility] neuropathy clinic.

## 2021-06-14 NOTE — ASSESSMENT & PLAN NOTE
I discussed with him that the CT angiogram of the head and neck is unremarkable for large vessel disease.  Treatment is symptomatic with aspirin and to reduce the risk factors for stroke such controlling hypertension, treating per cholesterolemia, losing weight, diet, exercise, avoid smoking and alcohol.

## 2021-06-23 ENCOUNTER — OFFICE VISIT (OUTPATIENT)
Dept: FAMILY MEDICINE CLINIC | Facility: CLINIC | Age: 51
End: 2021-06-23

## 2021-06-23 VITALS
OXYGEN SATURATION: 99 % | HEART RATE: 88 BPM | DIASTOLIC BLOOD PRESSURE: 84 MMHG | WEIGHT: 248.38 LBS | BODY MASS INDEX: 33.69 KG/M2 | TEMPERATURE: 97.5 F | SYSTOLIC BLOOD PRESSURE: 142 MMHG

## 2021-06-23 DIAGNOSIS — E04.1 THYROID NODULE: ICD-10-CM

## 2021-06-23 DIAGNOSIS — H53.9 VISION CHANGES: ICD-10-CM

## 2021-06-23 DIAGNOSIS — I10 ESSENTIAL HYPERTENSION: Primary | Chronic | ICD-10-CM

## 2021-06-23 DIAGNOSIS — R25.2 MUSCLE CRAMPING: ICD-10-CM

## 2021-06-23 DIAGNOSIS — K21.9 GASTROESOPHAGEAL REFLUX DISEASE WITHOUT ESOPHAGITIS: Chronic | ICD-10-CM

## 2021-06-23 DIAGNOSIS — G62.9 POLYNEUROPATHY: Chronic | ICD-10-CM

## 2021-06-23 DIAGNOSIS — Z76.0 ENCOUNTER FOR MEDICATION REFILL: ICD-10-CM

## 2021-06-23 PROBLEM — L73.9 FOLLICULITIS: Status: ACTIVE | Noted: 2021-06-23

## 2021-06-23 PROBLEM — J30.9 AR (ALLERGIC RHINITIS): Status: ACTIVE | Noted: 2021-06-23

## 2021-06-23 PROCEDURE — 99215 OFFICE O/P EST HI 40 MIN: CPT | Performed by: PHYSICIAN ASSISTANT

## 2021-06-23 RX ORDER — METOPROLOL SUCCINATE 50 MG/1
50 TABLET, EXTENDED RELEASE ORAL DAILY
Qty: 90 TABLET | Refills: 1 | Status: SHIPPED | OUTPATIENT
Start: 2021-06-23 | End: 2021-12-20

## 2021-06-23 NOTE — PROGRESS NOTES
Chief Complaint  Chief Complaint   Patient presents with   • Hypertension     follow up    • discuss neurology referral     has upcoming appt @ [facility]        Subjective     {Problem List  Visit Diagnosis   Encounters  Notes  Medications  Labs  Result Review Imaging  Media :23}     Carlos Mcgregor presents to [facility]  History of Present Illness   HTN: Patient presents for hypertension managenent. Patient is currently taking Toprol and Losartan and is consistent with medication. Patient denies chest pain, shortness of air and edema. Patient does check blood pressure at home with an average reading of 130-150/. He states it has been consistently high. He states he has vision changes when it is high; no recent eye exam. Today's manual bp was 142/84    He has been seeing Dr Valentine and states he is wanting to switch and has appt in August with [facility]rology. He states his neuropathy is getting worse and has been told it's prediabetic and he can't do anything about it. Labs from 1/21 and 4/21 reviewed. MRI Brain: .8cm focus of restricted diffusion in the left emmanuel taya; may represent a site of TIA/infact. CTA Head reviewed 4/27/21 negative. Cramping in left hand last weekend while outside; increased electrolytes and got out of heat which helped. No recent eye exam.    GERD: Patient is currently on omeprazole for the treatment of GERD. Patient is doing well without breakthrough symptoms.     Patient also has a 2.8cm thyroid nodule that has biopsied in 2019; need to check Alex for results. Results were benign.    PSA 10/1/20    Labs from 2021 were reviewed.    Medical History: has a past medical history of Allergic rhinitis due to allergen (11/02/2020), Arthritis, Elevated glucose (11/02/2020), Elevated LFTs, Essential hypertension (11/02/2020), Foot pain, left (09/27/2018), Foreign body in soft tissue (09/27/2018), GERD (gastroesophageal reflux disease) (11/02/2020),  Heel pain, HLD (hyperlipidemia), Hypertension, Ingrowing toenail, and Numbness in feet.   Surgical History: has a past surgical history that includes US Guided Fine Needle Aspiration (9/9/2019) and US Guided Fine Needle Aspiration (9/10/2019).   Family History: family history includes Diabetes in his maternal grandfather and mother; Heart disease in an other family member; Hypertension in an other family member.   Social History: reports that he has never smoked. He has never used smokeless tobacco. He reports current alcohol use. He reports that he does not use drugs.    Allergies: Patient has no known allergies.    Health Maintenance Due   Topic Date Due   • COLORECTAL CANCER SCREENING  Never done   • ANNUAL PHYSICAL  Never done   • ZOSTER VACCINE (1 of 2) Never done   • COVID-19 Vaccine (2 - Pfizer 2-dose series) 06/04/2021   • LIPID PANEL  06/14/2021       Immunization History   Administered Date(s) Administered   • COVID-19 (PFIZER) 05/14/2021   • Influenza, Unspecified 10/02/2020   • Tdap 09/25/2018       Objective     Vitals:    06/23/21 0854 06/23/21 0912   BP: (!) 168/103 142/84   Pulse: 88    Temp: 97.5 °F (36.4 °C)    TempSrc: Oral    SpO2: 99%    Weight: 113 kg (248 lb 6 oz)      Body mass index is 33.69 kg/m².       Physical Exam  Vitals and nursing note reviewed.   Constitutional:       Appearance: Normal appearance. He is obese.   HENT:      Head: Normocephalic and atraumatic.   Neck:      Vascular: No carotid bruit.      Comments: Thyroid : gland size enlarged, nontender,  Nodule present.  Cardiovascular:      Rate and Rhythm: Normal rate and regular rhythm.      Pulses: Normal pulses.      Heart sounds: Normal heart sounds.   Pulmonary:      Effort: Pulmonary effort is normal.      Breath sounds: Normal breath sounds.   Musculoskeletal:      Cervical back: Neck supple. No tenderness.      Right lower leg: No edema.      Left lower leg: No edema.   Lymphadenopathy:      Cervical: No cervical  adenopathy.   Neurological:      Mental Status: He is alert.   Psychiatric:         Mood and Affect: Mood normal.         Behavior: Behavior normal.            Result Review :         Common labs    Common Labsle 10/1/20 11/2/20 11/2/20 1/29/21 1/29/21 1/29/21     1008 1008 1526 1526 1526   Glucose 110 (A)  107 (A) 90     BUN 20  17 17     Creatinine 0.98  0.86 0.91     Sodium 142  139 138     Potassium 4.0  4.2 3.7     Chloride 106  105 103     Calcium 8.9  8.7 9.1     Albumin 4.5  4.3 4.4  3.5   Total Bilirubin 0.42  0.39 0.44     Alkaline Phosphatase 78  79 81     AST (SGOT) 41  46 25     ALT (SGPT) 107 (A)  120 (A) 66 (A)     WBC 7.41        Hemoglobin 16.2        Hematocrit 48.2        Platelets 242        Total Cholesterol 199        Triglycerides 173 (A)        HDL Cholesterol 35 (A)        LDL Cholesterol  129 (A)        Hemoglobin A1C  6.2 (A)   5.5    PSA 0.68        (A) Abnormal value       Comments are available for some flowsheets but are not being displayed.                           Assessment and Plan      Diagnoses and all orders for this visit:    1. Essential hypertension (Primary)  Comments:  Not currently stable; increase Toprol XL to 50mg qd, continue with Losartan. Work on weight reduction and decrease sodium. F/u 1 mth.  Orders:  -     metoprolol succinate XL (TOPROL-XL) 50 MG 24 hr tablet; Take 1 tablet by mouth Daily.  Dispense: 90 tablet; Refill: 1  -     Comprehensive Metabolic Panel; Future  -     Lipid Panel; Future  -     CBC & Differential; Future  -     Vitamin D 25 hydroxy; Future    2. Gastroesophageal reflux disease without esophagitis  Comments:  Stable; continue current medication.  Orders:  -     Comprehensive Metabolic Panel; Future  -     CBC & Differential; Future    3. Polyneuropathy  Comments:  Same; patient is scheduled with different neurology.  Orders:  -     Comprehensive Metabolic Panel; Future  -     CBC & Differential; Future  -     TSH; Future    4. Vision  changes  Comments:  Same; Referral for eye exam.  Orders:  -     Ambulatory Referral to Ophthalmology    5. Muscle cramping  Comments:  New problem; check vitamin D level.  Orders:  -     Vitamin D 25 hydroxy; Future    6. Encounter for medication refill    7. Thyroid nodule      Face to face time: [phone]  Charting [id number]    I spent 49 minutes caring for Carlos on this date of service. This time includes time spent by me in the following activities:preparing for the visit, reviewing tests, performing a medically appropriate examination and/or evaluation , counseling and educating the patient/family/caregiver, ordering medications, tests, or procedures, documenting information in the medical record and care coordination    Follow Up     Return in about 1 month (around 7/23/2021) for Recheck bp.    Patient was given instructions and counseling regarding his condition or for health maintenance advice. Please see specific information pulled into the AVS if appropriate.

## 2021-06-23 NOTE — PATIENT INSTRUCTIONS
Patient was educated/instructed on their diagnosis, treatment and medications prior to discharge from the clinic today. Patient advised to call the office with any questions or concerns.  Patient advised to monitor blood pressure at home and log readings. Patient informed that a blood pressure reading more than 130/80 is considered hypertension. For consistent readings greater than 140/90, patient is advised to follow up for management. Patient advised to limit sodium intake.

## 2021-06-25 ENCOUNTER — LAB (OUTPATIENT)
Dept: LAB | Facility: HOSPITAL | Age: 51
End: 2021-06-25

## 2021-06-25 DIAGNOSIS — G62.9 POLYNEUROPATHY: ICD-10-CM

## 2021-06-25 DIAGNOSIS — K21.9 GASTROESOPHAGEAL REFLUX DISEASE WITHOUT ESOPHAGITIS: ICD-10-CM

## 2021-06-25 DIAGNOSIS — I10 ESSENTIAL HYPERTENSION: ICD-10-CM

## 2021-06-25 DIAGNOSIS — R25.2 MUSCLE CRAMPING: ICD-10-CM

## 2021-06-25 LAB
25(OH)D3 SERPL-MCNC: 39 NG/ML
ALBUMIN SERPL-MCNC: 4.6 G/DL (ref 3.5–5.2)
ALBUMIN/GLOB SERPL: 2.1 G/DL
ALP SERPL-CCNC: 73 U/L (ref 39–117)
ALT SERPL W P-5'-P-CCNC: 71 U/L (ref 1–41)
ANION GAP SERPL CALCULATED.3IONS-SCNC: 13.8 MMOL/L (ref 5–15)
AST SERPL-CCNC: 24 U/L (ref 1–40)
BASOPHILS # BLD AUTO: 0.03 10*3/MM3 (ref 0–0.2)
BASOPHILS NFR BLD AUTO: 0.4 % (ref 0–1.5)
BILIRUB SERPL-MCNC: 0.6 MG/DL (ref 0–1.2)
BUN SERPL-MCNC: 21 MG/DL (ref 6–20)
BUN/CREAT SERPL: 21 (ref 7–25)
CALCIUM SPEC-SCNC: 8.7 MG/DL (ref 8.6–10.5)
CHLORIDE SERPL-SCNC: 104 MMOL/L (ref 98–107)
CHOLEST SERPL-MCNC: 182 MG/DL (ref 0–200)
CO2 SERPL-SCNC: 24.2 MMOL/L (ref 22–29)
CREAT SERPL-MCNC: 1 MG/DL (ref 0.76–1.27)
DEPRECATED RDW RBC AUTO: 40.2 FL (ref 37–54)
EOSINOPHIL # BLD AUTO: 0.07 10*3/MM3 (ref 0–0.4)
EOSINOPHIL NFR BLD AUTO: 0.9 % (ref 0.3–6.2)
ERYTHROCYTE [DISTWIDTH] IN BLOOD BY AUTOMATED COUNT: 12.8 % (ref 12.3–15.4)
GFR SERPL CREATININE-BSD FRML MDRD: 78 ML/MIN/1.73
GLOBULIN UR ELPH-MCNC: 2.2 GM/DL
GLUCOSE SERPL-MCNC: 87 MG/DL (ref 65–99)
HCT VFR BLD AUTO: 47.8 % (ref 37.5–51)
HDLC SERPL-MCNC: 31 MG/DL (ref 40–60)
HGB BLD-MCNC: 16.5 G/DL (ref 13–17.7)
IMM GRANULOCYTES # BLD AUTO: 0.03 10*3/MM3 (ref 0–0.05)
IMM GRANULOCYTES NFR BLD AUTO: 0.4 % (ref 0–0.5)
LDLC SERPL CALC-MCNC: 125 MG/DL (ref 0–100)
LDLC/HDLC SERPL: 3.95 {RATIO}
LYMPHOCYTES # BLD AUTO: 1.9 10*3/MM3 (ref 0.7–3.1)
LYMPHOCYTES NFR BLD AUTO: 24.8 % (ref 19.6–45.3)
MCH RBC QN AUTO: 29.9 PG (ref 26.6–33)
MCHC RBC AUTO-ENTMCNC: 34.5 G/DL (ref 31.5–35.7)
MCV RBC AUTO: 86.6 FL (ref 79–97)
MONOCYTES # BLD AUTO: 0.62 10*3/MM3 (ref 0.1–0.9)
MONOCYTES NFR BLD AUTO: 8.1 % (ref 5–12)
NEUTROPHILS NFR BLD AUTO: 5.01 10*3/MM3 (ref 1.7–7)
NEUTROPHILS NFR BLD AUTO: 65.4 % (ref 42.7–76)
NRBC BLD AUTO-RTO: 0 /100 WBC (ref 0–0.2)
PLATELET # BLD AUTO: 256 10*3/MM3 (ref 140–450)
PMV BLD AUTO: 9.9 FL (ref 6–12)
POTASSIUM SERPL-SCNC: 3.9 MMOL/L (ref 3.5–5.2)
PROT SERPL-MCNC: 6.8 G/DL (ref 6–8.5)
RBC # BLD AUTO: 5.52 10*6/MM3 (ref 4.14–5.8)
SODIUM SERPL-SCNC: 142 MMOL/L (ref 136–145)
TRIGL SERPL-MCNC: 143 MG/DL (ref 0–150)
TSH SERPL DL<=0.05 MIU/L-ACNC: 0.74 UIU/ML (ref 0.27–4.2)
VLDLC SERPL-MCNC: 26 MG/DL (ref 5–40)
WBC # BLD AUTO: 7.66 10*3/MM3 (ref 3.4–10.8)

## 2021-06-25 PROCEDURE — 80061 LIPID PANEL: CPT

## 2021-06-25 PROCEDURE — 82306 VITAMIN D 25 HYDROXY: CPT

## 2021-06-25 PROCEDURE — 85025 COMPLETE CBC W/AUTO DIFF WBC: CPT

## 2021-06-25 PROCEDURE — 36415 COLL VENOUS BLD VENIPUNCTURE: CPT

## 2021-06-25 PROCEDURE — 84443 ASSAY THYROID STIM HORMONE: CPT

## 2021-06-25 PROCEDURE — 80053 COMPREHEN METABOLIC PANEL: CPT

## 2021-07-23 ENCOUNTER — OFFICE VISIT (OUTPATIENT)
Dept: FAMILY MEDICINE CLINIC | Facility: CLINIC | Age: 51
End: 2021-07-23

## 2021-07-23 VITALS
SYSTOLIC BLOOD PRESSURE: 122 MMHG | DIASTOLIC BLOOD PRESSURE: 80 MMHG | TEMPERATURE: 97.3 F | WEIGHT: 249.2 LBS | BODY MASS INDEX: 33.75 KG/M2 | HEIGHT: 72 IN | HEART RATE: 80 BPM | OXYGEN SATURATION: 96 %

## 2021-07-23 DIAGNOSIS — I10 ESSENTIAL HYPERTENSION: Primary | Chronic | ICD-10-CM

## 2021-07-23 DIAGNOSIS — G62.9 POLYNEUROPATHY: ICD-10-CM

## 2021-07-23 DIAGNOSIS — K21.9 GASTROESOPHAGEAL REFLUX DISEASE WITHOUT ESOPHAGITIS: Chronic | ICD-10-CM

## 2021-07-23 DIAGNOSIS — E04.1 THYROID NODULE: ICD-10-CM

## 2021-07-23 DIAGNOSIS — J30.9 ALLERGIC RHINITIS, UNSPECIFIED SEASONALITY, UNSPECIFIED TRIGGER: Chronic | ICD-10-CM

## 2021-07-23 DIAGNOSIS — Z76.0 ENCOUNTER FOR MEDICATION REFILL: ICD-10-CM

## 2021-07-23 DIAGNOSIS — I10 ESSENTIAL HYPERTENSION: Primary | ICD-10-CM

## 2021-07-23 PROCEDURE — 99214 OFFICE O/P EST MOD 30 MIN: CPT | Performed by: PHYSICIAN ASSISTANT

## 2021-07-23 RX ORDER — LOSARTAN POTASSIUM 100 MG/1
TABLET ORAL
Qty: 90 TABLET | Refills: 1 | Status: SHIPPED | OUTPATIENT
Start: 2021-07-23 | End: 2022-01-03

## 2021-07-23 NOTE — PROGRESS NOTES
Chief Complaint  Chief Complaint   Patient presents with   • Hypertension     follow up    • Allergies     follow up   • Heartburn     follow up        Subjective          Carlos Mcgregor presents to [facility]  History of Present Illness     HTN: Patient presents for hypertension managenent. Patient is currently taking Losartan and Metoprolol and is consistent with medication. Patient denies chest pain, shortness of air and edema. Patient does check blood pressure at home with an average reading of 150's/90's but states he has not checked it the past week due to being out of town. Today in office it is 151/91. Patient did go for eye exam; got new prescription; states vision is better    Allergic Rhinitis: Patient presents for management of Allergic Rhinitis. Patient is currently on Singuliar. Symptoms are well controlled.    GERD: Patient is currently on Omeprazole for the treatment of GERD. Patient is doing well without breakthrough symptoms. Patient has recently tried to discontinue medication and symptoms of reflux returned.    H/o thyroid nodule: last biopsied 9/9/19 and was benign; will continue to follow with ultrasound at next appt.    Medical History: has a past medical history of Allergic rhinitis due to allergen (11/02/2020), Arthritis, Elevated glucose (11/02/2020), Elevated LFTs, Essential hypertension (11/02/2020), Foot pain, left (09/27/2018), Foreign body in soft tissue (09/27/2018), GERD (gastroesophageal reflux disease) (11/02/2020), Heel pain, HLD (hyperlipidemia), Hypertension, Ingrowing toenail, and Numbness in feet.   Surgical History: has a past surgical history that includes US Guided Fine Needle Aspiration (9/9/2019) and US Guided Fine Needle Aspiration (9/10/2019).   Family History: family history includes Diabetes in his maternal grandfather and mother; Heart disease in an other family member; Hypertension in an other family member.   Social History:  "reports that he has never smoked. He has never used smokeless tobacco. He reports current alcohol use. He reports that he does not use drugs.    Allergies: Patient has no known allergies.    Health Maintenance Due   Topic Date Due   • COLORECTAL CANCER SCREENING  Never done   • ANNUAL PHYSICAL  Never done   • ZOSTER VACCINE (1 of 2) Never done   • COVID-19 Vaccine (2 - Pfizer 2-dose series) 06/04/2021       Immunization History   Administered Date(s) Administered   • COVID-19 (PFIZER) 05/14/2021   • Influenza, Unspecified 10/02/2020   • Tdap 09/25/2018       Objective     Vitals:    07/23/21 0839 07/23/21 0916   BP: 151/91 122/80   Pulse: 80    Temp: 97.3 °F (36.3 °C)    TempSrc: Oral    SpO2: 96%    Weight: 113 kg (249 lb 3.2 oz)    Height: 182.9 cm (72\")      Body mass index is 33.8 kg/m².       Physical Exam  Vitals and nursing note reviewed.   Constitutional:       Appearance: Normal appearance.   HENT:      Head: Normocephalic and atraumatic.   Neck:      Vascular: No carotid bruit.      Comments: Thyroid : gland size normal, nontender, no nodules or masses present on palpation   Cardiovascular:      Rate and Rhythm: Normal rate and regular rhythm.      Pulses: Normal pulses.      Heart sounds: Normal heart sounds.   Pulmonary:      Effort: Pulmonary effort is normal.      Breath sounds: Normal breath sounds.   Musculoskeletal:      Cervical back: Neck supple. No tenderness.      Right lower leg: No edema.      Left lower leg: No edema.   Lymphadenopathy:      Cervical: No cervical adenopathy.   Neurological:      Mental Status: He is alert.   Psychiatric:         Mood and Affect: Mood normal.         Behavior: Behavior normal.           Result Review :                           Assessment and Plan      Diagnoses and all orders for this visit:    1. Essential hypertension (Primary)  Comments:  Stable; continue current medication and f/u in 5mths.    2. Gastroesophageal reflux disease without " esophagitis  Comments:  Stable; continue current medication and f/u in 5mths.    3. Allergic rhinitis, unspecified seasonality, unspecified trigger  Comments:  Stable; continue current medication and f/u in 5mths.    4. Polyneuropathy  Comments:  Same: patient has appt with neurology on 8/14.    5. Encounter for medication refill    6. Thyroid nodule  Comments:  Stable: continue to follow with ultrasound at next appt in December.            Follow Up     Return in about 5 months (around 12/23/2021).    Patient was given instructions and counseling regarding his condition or for health maintenance advice. Please see specific information pulled into the AVS if appropriate.

## 2021-07-23 NOTE — PATIENT INSTRUCTIONS
Patient was educated/instructed on their diagnosis, treatment and medications prior to discharge from the clinic today. Patient advised to call the office with any questions or concerns.\

## 2021-07-27 DIAGNOSIS — J30.9 ALLERGIC RHINITIS, UNSPECIFIED SEASONALITY, UNSPECIFIED TRIGGER: Primary | ICD-10-CM

## 2021-07-28 RX ORDER — MONTELUKAST SODIUM 10 MG/1
TABLET ORAL
Qty: 90 TABLET | Refills: 1 | Status: SHIPPED | OUTPATIENT
Start: 2021-07-28 | End: 2022-01-03

## 2021-11-15 ENCOUNTER — TRANSCRIBE ORDERS (OUTPATIENT)
Dept: LAB | Facility: HOSPITAL | Age: 51
End: 2021-11-15

## 2021-11-15 ENCOUNTER — LAB (OUTPATIENT)
Dept: LAB | Facility: HOSPITAL | Age: 51
End: 2021-11-15

## 2021-11-15 DIAGNOSIS — G61.89 CHRONIC INFLAMMATORY AXONAL POLYNEUROPATHY (HCC): ICD-10-CM

## 2021-11-15 DIAGNOSIS — G61.89 CHRONIC INFLAMMATORY AXONAL POLYNEUROPATHY (HCC): Primary | ICD-10-CM

## 2021-12-20 DIAGNOSIS — I10 ESSENTIAL HYPERTENSION: Chronic | ICD-10-CM

## 2021-12-20 RX ORDER — METOPROLOL SUCCINATE 50 MG/1
50 TABLET, EXTENDED RELEASE ORAL DAILY
Qty: 30 TABLET | Refills: 0 | Status: SHIPPED | OUTPATIENT
Start: 2021-12-20 | End: 2022-02-14

## 2022-01-03 ENCOUNTER — TELEPHONE (OUTPATIENT)
Dept: SURGERY | Facility: CLINIC | Age: 52
End: 2022-01-03

## 2022-01-03 DIAGNOSIS — Z12.11 SCREENING FOR COLON CANCER: Primary | ICD-10-CM

## 2022-01-03 DIAGNOSIS — J30.9 ALLERGIC RHINITIS, UNSPECIFIED SEASONALITY, UNSPECIFIED TRIGGER: ICD-10-CM

## 2022-01-03 DIAGNOSIS — I10 ESSENTIAL HYPERTENSION: ICD-10-CM

## 2022-01-03 RX ORDER — LOSARTAN POTASSIUM 100 MG/1
TABLET ORAL
Qty: 30 TABLET | Refills: 0 | Status: SHIPPED | OUTPATIENT
Start: 2022-01-03 | End: 2022-03-04

## 2022-01-03 RX ORDER — MONTELUKAST SODIUM 10 MG/1
TABLET ORAL
Qty: 30 TABLET | Refills: 0 | Status: SHIPPED | OUTPATIENT
Start: 2022-01-03 | End: 2022-04-18

## 2022-01-03 NOTE — TELEPHONE ENCOUNTER
Pt was scheduled for a colonoscopy but had poor prep. He will need a new referral for a colonoscopy.

## 2022-01-05 NOTE — TELEPHONE ENCOUNTER
Left a voicemail message on patient's phone informing him to make an appointment for future refills.

## 2022-01-07 ENCOUNTER — TRANSCRIBE ORDERS (OUTPATIENT)
Dept: LAB | Facility: HOSPITAL | Age: 52
End: 2022-01-07

## 2022-01-07 ENCOUNTER — LAB (OUTPATIENT)
Dept: LAB | Facility: HOSPITAL | Age: 52
End: 2022-01-07

## 2022-01-07 DIAGNOSIS — Z01.818 PRE-OP TESTING: ICD-10-CM

## 2022-01-07 DIAGNOSIS — Z01.818 PRE-OP TESTING: Primary | ICD-10-CM

## 2022-01-07 PROCEDURE — C9803 HOPD COVID-19 SPEC COLLECT: HCPCS

## 2022-01-07 PROCEDURE — U0004 COV-19 TEST NON-CDC HGH THRU: HCPCS

## 2022-01-09 LAB — SARS-COV-2 RNA PNL SPEC NAA+PROBE: NOT DETECTED

## 2022-01-24 ENCOUNTER — TRANSCRIBE ORDERS (OUTPATIENT)
Dept: LAB | Facility: HOSPITAL | Age: 52
End: 2022-01-24

## 2022-01-24 ENCOUNTER — LAB (OUTPATIENT)
Dept: LAB | Facility: HOSPITAL | Age: 52
End: 2022-01-24

## 2022-01-24 DIAGNOSIS — Z20.822 ENCOUNTER FOR LABORATORY TESTING FOR COVID-19 VIRUS: ICD-10-CM

## 2022-01-24 DIAGNOSIS — Z20.822 ENCOUNTER FOR LABORATORY TESTING FOR COVID-19 VIRUS: Primary | ICD-10-CM

## 2022-01-24 PROCEDURE — C9803 HOPD COVID-19 SPEC COLLECT: HCPCS

## 2022-01-24 PROCEDURE — U0004 COV-19 TEST NON-CDC HGH THRU: HCPCS

## 2022-01-25 LAB — SARS-COV-2 RNA PNL SPEC NAA+PROBE: DETECTED

## 2022-02-14 DIAGNOSIS — I10 ESSENTIAL HYPERTENSION: Chronic | ICD-10-CM

## 2022-02-14 RX ORDER — METOPROLOL SUCCINATE 50 MG/1
50 TABLET, EXTENDED RELEASE ORAL DAILY
Qty: 14 TABLET | Refills: 0 | Status: SHIPPED | OUTPATIENT
Start: 2022-02-14 | End: 2022-03-04

## 2022-02-14 NOTE — TELEPHONE ENCOUNTER
Patient was last seen on 6/23/21; was told on 12/20/21 no further refills without appt. Please notify patient he needs appt for further refills.

## 2022-02-22 ENCOUNTER — OFFICE VISIT (OUTPATIENT)
Dept: SURGERY | Facility: CLINIC | Age: 52
End: 2022-02-22

## 2022-02-22 ENCOUNTER — PREP FOR SURGERY (OUTPATIENT)
Dept: OTHER | Facility: HOSPITAL | Age: 52
End: 2022-02-22

## 2022-02-22 VITALS — WEIGHT: 256 LBS | BODY MASS INDEX: 34.67 KG/M2 | RESPIRATION RATE: 16 BRPM | HEART RATE: 77 BPM | HEIGHT: 72 IN

## 2022-02-22 DIAGNOSIS — Z12.11 SCREENING FOR MALIGNANT NEOPLASM OF COLON: Primary | ICD-10-CM

## 2022-02-22 PROCEDURE — S0285 CNSLT BEFORE SCREEN COLONOSC: HCPCS | Performed by: NURSE PRACTITIONER

## 2022-02-22 RX ORDER — CLOBETASOL PROPIONATE 0.46 MG/ML
SOLUTION TOPICAL
COMMUNITY
Start: 2022-02-21

## 2022-02-22 RX ORDER — SODIUM CHLORIDE 0.9 % (FLUSH) 0.9 %
3 SYRINGE (ML) INJECTION EVERY 12 HOURS SCHEDULED
Status: CANCELLED | OUTPATIENT
Start: 2022-02-22

## 2022-02-22 RX ORDER — SODIUM CHLORIDE 0.9 % (FLUSH) 0.9 %
10 SYRINGE (ML) INJECTION AS NEEDED
Status: CANCELLED | OUTPATIENT
Start: 2022-02-22

## 2022-02-22 RX ORDER — POLYETHYLENE GLYCOL 3350 17 G/17G
POWDER, FOR SOLUTION ORAL
Qty: 238 PACKET | Refills: 0 | Status: SHIPPED | OUTPATIENT
Start: 2022-02-22 | End: 2022-04-21 | Stop reason: HOSPADM

## 2022-02-22 RX ORDER — DOXYCYCLINE 100 MG/1
CAPSULE ORAL
COMMUNITY
Start: 2022-02-21 | End: 2022-04-20

## 2022-02-22 NOTE — PROGRESS NOTES
Chief Complaint: Colonoscopy (consult)    Subjective      Colonoscopy consultation    History of Present Illness  Carlos Mcgregor is a 51 y.o. male presents to [facility] for colonoscopy consultation.    Patient presents today on referral from Dr. Shiva Heaton.    Patient denies any abdominal pain, change in bowel habit, rectal bleeding.    Denies any family history of colorectal cancer.    2/21: colonoscopy (Sudarshan): Poor prep     Objective     Past Medical History:   Diagnosis Date   • Allergic rhinitis due to allergen 11/02/2020   • Arthritis    • Elevated glucose 11/02/2020   • Elevated LFTs    • Essential hypertension 11/02/2020   • Foot pain, left 09/27/2018   • Foreign body in soft tissue 09/27/2018   • GERD (gastroesophageal reflux disease) 11/02/2020   • Heel pain    • HLD (hyperlipidemia)    • Hypertension    • Ingrowing toenail    • Numbness in feet        Past Surgical History:   Procedure Laterality Date   • US GUIDED FINE NEEDLE ASPIRATION  9/9/2019   • US GUIDED FINE NEEDLE ASPIRATION  9/10/2019       Outpatient Medications Marked as Taking for the 2/22/22 encounter (Office Visit) with Kaylah Carlson APRN   Medication Sig Dispense Refill   • clobetasol (TEMOVATE) 0.05 % external solution      • doxycycline (MONODOX) 100 MG capsule      • losartan (COZAAR) 100 MG tablet TAKE 1 TABLET BY MOUTH EVERY DAY 30 tablet 0   • metoprolol succinate XL (TOPROL-XL) 50 MG 24 hr tablet TAKE 1 TABLET BY MOUTH DAILY 14 tablet 0       No Known Allergies     Family History   Problem Relation Age of Onset   • Diabetes Mother    • Diabetes Maternal Grandfather    • Hypertension Other    • Heart disease Other        Social History     Socioeconomic History   • Marital status:    Tobacco Use   • Smoking status: Never Smoker   • Smokeless tobacco: Never Used   Vaping Use   • Vaping Use: Never used   Substance and Sexual Activity   • Alcohol use: Yes     Comment: SOCIALLY   • Drug use:  "Never   • Sexual activity: Defer       Review of Systems   Constitutional: Negative for chills and fever.   Gastrointestinal: Negative for abdominal distention, abdominal pain, anal bleeding, blood in stool, constipation, diarrhea and rectal pain.        Vital Signs:   Pulse 77   Resp 16   Ht 182.9 cm (72\")   Wt 116 kg (256 lb)   BMI 34.72 kg/m²      Physical Exam  Constitutional:       Appearance: Normal appearance.   HENT:      Head: Normocephalic.   Cardiovascular:      Rate and Rhythm: Normal rate.   Pulmonary:      Effort: Pulmonary effort is normal.   Abdominal:      General: Abdomen is flat.      Palpations: Abdomen is soft.   Skin:     General: Skin is warm and dry.   Neurological:      General: No focal deficit present.      Mental Status: He is alert and oriented to person, place, and time.   Psychiatric:         Mood and Affect: Mood normal.         Thought Content: Thought content normal.          Result Review :    []  Laboratory  []  Radiology  []  Pathology  []  Microbiology  []  EKG/Telemetry   []  Cardiology/Vascular   [x]  Old records  Today have reviewed Dr. Heaton's previous operative report.     Assessment and Plan    Diagnoses and all orders for this visit:    1. Screening for malignant neoplasm of colon (Primary)    Other orders  -     polyethylene glycol (MIRALAX) 17 g packet; Take as directed.  Instructions given in office.  Dispense: 238 g bottle  Dispense: 238 packet; Refill: 0      Low residue diet 2 days prior with 2 light colon prep; Day prior to procedure orange prep  Follow Up   Return for Schedule colonoscopy with Dr. Oliver on 4/21/2022 at [facility].     Hospital arrival time 7 AM    Possible risks/complications, benefits, and alternatives to surgical or invasive procedure have been explained to patient and/or legal guardian.     Patient has been evaluated and can tolerate anesthesia and/or sedation. Risks, benefits, and alternatives to anesthesia and sedation have " been explained to patient and/or legal guardian.  Patient verbalizes understanding and is willing to proceed with the above plan.      Patient was given instructions and counseling regarding his condition or for health maintenance advice. Please see specific information pulled into the AVS if appropriate.

## 2022-03-04 DIAGNOSIS — I10 ESSENTIAL HYPERTENSION: ICD-10-CM

## 2022-03-04 DIAGNOSIS — I10 ESSENTIAL HYPERTENSION: Chronic | ICD-10-CM

## 2022-03-04 RX ORDER — LOSARTAN POTASSIUM 100 MG/1
TABLET ORAL
Qty: 14 TABLET | Refills: 0 | OUTPATIENT
Start: 2022-03-04 | End: 2022-03-21

## 2022-03-04 RX ORDER — METOPROLOL SUCCINATE 50 MG/1
50 TABLET, EXTENDED RELEASE ORAL DAILY
Qty: 14 TABLET | Refills: 0 | OUTPATIENT
Start: 2022-03-04 | End: 2022-03-21

## 2022-03-04 NOTE — TELEPHONE ENCOUNTER
Please CALL patient: Patient was last seen on 6/23/21; was told on 12/20/21 no further refills without appt and again on 2/14.  Please notify patient he needs appt for further refills.

## 2022-04-15 DIAGNOSIS — J30.9 ALLERGIC RHINITIS, UNSPECIFIED SEASONALITY, UNSPECIFIED TRIGGER: ICD-10-CM

## 2022-04-18 ENCOUNTER — OFFICE VISIT (OUTPATIENT)
Dept: FAMILY MEDICINE CLINIC | Facility: CLINIC | Age: 52
End: 2022-04-18

## 2022-04-18 VITALS
BODY MASS INDEX: 34.54 KG/M2 | HEIGHT: 72 IN | SYSTOLIC BLOOD PRESSURE: 138 MMHG | OXYGEN SATURATION: 97 % | WEIGHT: 255 LBS | DIASTOLIC BLOOD PRESSURE: 88 MMHG | HEART RATE: 85 BPM

## 2022-04-18 DIAGNOSIS — R73.09 ELEVATED GLUCOSE: Primary | ICD-10-CM

## 2022-04-18 DIAGNOSIS — Z76.0 ENCOUNTER FOR MEDICATION REFILL: ICD-10-CM

## 2022-04-18 DIAGNOSIS — I10 HYPERTENSION, UNSPECIFIED TYPE: Chronic | ICD-10-CM

## 2022-04-18 DIAGNOSIS — R00.0 TACHYCARDIA: ICD-10-CM

## 2022-04-18 PROCEDURE — 99214 OFFICE O/P EST MOD 30 MIN: CPT | Performed by: PHYSICIAN ASSISTANT

## 2022-04-18 RX ORDER — LOSARTAN POTASSIUM 100 MG/1
100 TABLET ORAL DAILY
Qty: 90 TABLET | Refills: 0 | Status: SHIPPED | OUTPATIENT
Start: 2022-04-18 | End: 2022-06-23 | Stop reason: SDUPTHER

## 2022-04-18 RX ORDER — METOPROLOL SUCCINATE 50 MG/1
50 TABLET, EXTENDED RELEASE ORAL 2 TIMES DAILY
Qty: 180 TABLET | Refills: 0 | Status: SHIPPED | OUTPATIENT
Start: 2022-04-18 | End: 2022-06-23

## 2022-04-18 RX ORDER — MONTELUKAST SODIUM 10 MG/1
TABLET ORAL
Qty: 30 TABLET | Refills: 0 | Status: SHIPPED | OUTPATIENT
Start: 2022-04-18 | End: 2022-04-18

## 2022-04-18 NOTE — PROGRESS NOTES
Chief Complaint  Chief Complaint   Patient presents with   • Hypertension   • Allergic Rhinitis   • Heartburn       Subjective          Carlos Mcgregor presents to [facility]  History of Present Illness     HTN: Patient presents for hypertension managenent. Patient is currently taking Losartan and Metoprolol. Last office visit was 7/23/21. Patient is concerned that he may need a higher dose or a different medication; patient doubled both the Losartan and Toprol XL on own. Patient denies chest pain, shortness of air and edema. Patient does check blood pressure at home with an average reading of 152/102. The patient states that sometimes his readings are 200/130 and his pulse is 130. He states that when this happens he does not feel any different. Today in office it is 152/102; recheck 150/98 in left arm and 138/88 in right arm.    Patient is requesting refills of both Losartan and Metoprolol today.     Allergic Rhinitis:Patient no longer takes Singuliar. He states that it keeps him too drowsy during the day and keeps him awake at night.     Patient has a Colonoscopy on 4/21/22 and would like to have instructions for the prep, he lost his original copy.     LABS:1/24/22  COVID: VACCINATED  FLU: VACCINATED  TDAP: 9/2018  COLON: 4/21/22      Medical History: has a past medical history of Allergic rhinitis due to allergen (11/02/2020), Arthritis, Elevated glucose (11/02/2020), Elevated LFTs, Essential hypertension (11/02/2020), Foot pain, left (09/27/2018), Foreign body in soft tissue (09/27/2018), GERD (gastroesophageal reflux disease) (11/02/2020), Heel pain, HLD (hyperlipidemia), Hypertension, Ingrowing toenail, and Numbness in feet.   Surgical History: has a past surgical history that includes US Guided Fine Needle Aspiration (09/09/2019); US Guided Fine Needle Aspiration (09/10/2019); Colonoscopy; Esophagogastroduodenoscopy; and Colonoscopy (N/A, 4/21/2022).   Family History:  "family history includes Diabetes in his maternal grandfather and mother; Heart disease in an other family member; Hypertension in an other family member.   Social History: reports that he has never smoked. He has never used smokeless tobacco. He reports current alcohol use. He reports that he does not use drugs.    Allergies: Latex    Health Maintenance Due   Topic Date Due   • ANNUAL PHYSICAL  Never done   • ZOSTER VACCINE (1 of 2) Never done   • COVID-19 Vaccine (3 - Booster for Pfizer series) 02/23/2022       Immunization History   Administered Date(s) Administered   • COVID-19 (PFIZER) PURPLE CAP 05/14/2021, 09/23/2021   • Influenza, Unspecified 10/02/2020   • Tdap 09/25/2018       Objective     Vitals:    04/18/22 1141 04/18/22 1222 04/18/22 1223   BP: (!) 152/102 150/98 138/88   BP Location: Left arm Left arm Right arm   Patient Position: Sitting     Cuff Size: Adult     Pulse: 85     SpO2: 97%     Weight: 116 kg (255 lb)     Height: 182.9 cm (72\")       Body mass index is 34.58 kg/m².       Physical Exam  Vitals and nursing note reviewed.   Constitutional:       Appearance: Normal appearance. He is obese.   HENT:      Head: Normocephalic and atraumatic.   Neck:      Vascular: No carotid bruit.      Comments: Thyroid : gland size normal, nontender, no nodules or masses present on palpation   Cardiovascular:      Rate and Rhythm: Normal rate and regular rhythm.      Pulses: Normal pulses.      Heart sounds: Normal heart sounds.   Pulmonary:      Effort: Pulmonary effort is normal.      Breath sounds: Normal breath sounds.   Musculoskeletal:      Cervical back: Neck supple. No tenderness.      Right lower leg: No edema.      Left lower leg: No edema.   Lymphadenopathy:      Cervical: No cervical adenopathy.   Neurological:      Mental Status: He is alert.   Psychiatric:         Mood and Affect: Mood normal.         Behavior: Behavior normal.           Result Review :                           Assessment and Plan "      Diagnoses and all orders for this visit:    1. Elevated glucose (Primary)  Comments:  Last lab shows elevated glucose, recheck with A1c.  Orders:  -     Hemoglobin A1c; Future    2. Hypertension, unspecified type  Comments:  Not stable; continue with losartan 100mg and increase Toprol XL 50mg to one table twice daily; check labs and follow up in about 4-6 weeks.  Orders:  -     Comprehensive Metabolic Panel; Future  -     Lipid Panel; Future  -     CBC & Differential; Future  -     TSH; Future  -     Urinalysis With Culture If Indicated -; Future  -     losartan (COZAAR) 100 MG tablet; Take 1 tablet by mouth Daily for 90 days.  Dispense: 90 tablet; Refill: 0  -     metoprolol succinate XL (TOPROL-XL) 50 MG 24 hr tablet; Take 1 tablet by mouth 2 (Two) Times a Day.  Dispense: 180 tablet; Refill: 0    3. Encounter for medication refill  -     losartan (COZAAR) 100 MG tablet; Take 1 tablet by mouth Daily for 90 days.  Dispense: 90 tablet; Refill: 0  -     metoprolol succinate XL (TOPROL-XL) 50 MG 24 hr tablet; Take 1 tablet by mouth 2 (Two) Times a Day.  Dispense: 180 tablet; Refill: 0    4. Tachycardia  Comments:  Order Holter monitor to evaluate tachycardia.  Orders:  -     Holter monitor - 48 hour; Future            Follow Up     Return in about 4 weeks (around 5/16/2022).    Patient was given instructions and counseling regarding his condition or for health maintenance advice. Please see specific information pulled into the AVS if appropriate.

## 2022-04-20 NOTE — PRE-PROCEDURE INSTRUCTIONS
Called patient to discuss instructions for laxative. Patient stated understanding for 2 part prep. Discussed skin prep, clear liquid diet, and pre-op medications. Patient aware they can take Metoprolol.  Patient stated understanding, No other issues or concerns noted currently per patient.  Patient is vaccinated for covid-19.

## 2022-04-21 ENCOUNTER — ANESTHESIA EVENT (OUTPATIENT)
Dept: GASTROENTEROLOGY | Facility: HOSPITAL | Age: 52
End: 2022-04-21

## 2022-04-21 ENCOUNTER — ANESTHESIA (OUTPATIENT)
Dept: GASTROENTEROLOGY | Facility: HOSPITAL | Age: 52
End: 2022-04-21

## 2022-04-21 ENCOUNTER — HOSPITAL ENCOUNTER (OUTPATIENT)
Facility: HOSPITAL | Age: 52
Setting detail: HOSPITAL OUTPATIENT SURGERY
Discharge: HOME | End: 2022-04-21
Attending: SURGERY | Admitting: SURGERY

## 2022-04-21 VITALS
SYSTOLIC BLOOD PRESSURE: 122 MMHG | WEIGHT: 249.12 LBS | OXYGEN SATURATION: 97 % | DIASTOLIC BLOOD PRESSURE: 86 MMHG | BODY MASS INDEX: 33.74 KG/M2 | HEART RATE: 74 BPM | HEIGHT: 72 IN | TEMPERATURE: 98.2 F | RESPIRATION RATE: 16 BRPM

## 2022-04-21 DIAGNOSIS — Z12.11 SCREENING FOR MALIGNANT NEOPLASM OF COLON: ICD-10-CM

## 2022-04-21 PROCEDURE — 25010000002 PROPOFOL 10 MG/ML EMULSION: Performed by: NURSE ANESTHETIST, CERTIFIED REGISTERED

## 2022-04-21 PROCEDURE — 88305 TISSUE EXAM BY PATHOLOGIST: CPT | Performed by: SURGERY

## 2022-04-21 RX ORDER — SODIUM CHLORIDE, SODIUM LACTATE, POTASSIUM CHLORIDE, CALCIUM CHLORIDE 600; 310; 30; 20 MG/100ML; MG/100ML; MG/100ML; MG/100ML
30 INJECTION, SOLUTION INTRAVENOUS CONTINUOUS
Status: DISCONTINUED | OUTPATIENT
Start: 2022-04-21 | End: 2022-04-21 | Stop reason: HOSPADM

## 2022-04-21 RX ORDER — SODIUM CHLORIDE 0.9 % (FLUSH) 0.9 %
10 SYRINGE (ML) INJECTION AS NEEDED
Status: DISCONTINUED | OUTPATIENT
Start: 2022-04-21 | End: 2022-04-21 | Stop reason: HOSPADM

## 2022-04-21 RX ORDER — SODIUM CHLORIDE 0.9 % (FLUSH) 0.9 %
3 SYRINGE (ML) INJECTION EVERY 12 HOURS SCHEDULED
Status: DISCONTINUED | OUTPATIENT
Start: 2022-04-21 | End: 2022-04-21 | Stop reason: HOSPADM

## 2022-04-21 RX ORDER — LIDOCAINE HYDROCHLORIDE 20 MG/ML
INJECTION, SOLUTION EPIDURAL; INFILTRATION; INTRACAUDAL; PERINEURAL AS NEEDED
Status: DISCONTINUED | OUTPATIENT
Start: 2022-04-21 | End: 2022-04-21 | Stop reason: SURG

## 2022-04-21 RX ORDER — PROPOFOL 10 MG/ML
VIAL (ML) INTRAVENOUS AS NEEDED
Status: DISCONTINUED | OUTPATIENT
Start: 2022-04-21 | End: 2022-04-21 | Stop reason: SURG

## 2022-04-21 RX ADMIN — PROPOFOL 250 MCG/KG/MIN: 10 INJECTION, EMULSION INTRAVENOUS at 08:40

## 2022-04-21 RX ADMIN — PROPOFOL 100 MG: 10 INJECTION, EMULSION INTRAVENOUS at 08:39

## 2022-04-21 RX ADMIN — SODIUM CHLORIDE, POTASSIUM CHLORIDE, SODIUM LACTATE AND CALCIUM CHLORIDE 30 ML/HR: 600; 310; 30; 20 INJECTION, SOLUTION INTRAVENOUS at 07:23

## 2022-04-21 RX ADMIN — LIDOCAINE HYDROCHLORIDE 100 MG: 20 INJECTION, SOLUTION EPIDURAL; INFILTRATION; INTRACAUDAL; PERINEURAL at 08:39

## 2022-04-21 NOTE — ANESTHESIA PREPROCEDURE EVALUATION
Anesthesia Evaluation     Patient summary reviewed and Nursing notes reviewed   no history of anesthetic complications:  NPO Solid Status: > 8 hours  NPO Liquid Status: > 2 hours           Airway   Mallampati: II  TM distance: >3 FB  Neck ROM: full  No difficulty expected  Dental      Pulmonary - negative pulmonary ROS and normal exam    breath sounds clear to auscultation  Cardiovascular - normal exam  Exercise tolerance: good (4-7 METS)    Rhythm: regular  Rate: normal    (+) hypertension, hyperlipidemia,       Neuro/Psych- negative ROS  GI/Hepatic/Renal/Endo    (+) obesity,  GERD,  thyroid problem hypothyroidism    Musculoskeletal     Abdominal    Substance History      OB/GYN negative ob/gyn ROS         Other   arthritis,                      Anesthesia Plan    ASA 2     general   total IV anesthesia    Anesthetic plan, all risks, benefits, and alternatives have been provided, discussed and informed consent has been obtained with: patient.        CODE STATUS:

## 2022-04-21 NOTE — H&P
General Surgery/Colorectal Surgery Note    Patient Name:  Carlos Mcgregor  YOB: 1970  [id number]    Referring Provider: Paolo Oliver, *      Patient Care Team:  Dania Minor PA as PCP - General (Family Medicine)  Gege Dee PA-C as Consulting Physician (Dermatology)    Subjective .     History of present illness:    HPI   from Dr. Shiva Heaton.     Patient denies any abdominal pain, change in bowel habit, rectal bleeding.     Denies any family history of colorectal cancer.     2/21: colonoscopy (Sudarshan): Poor prep    History:  Past Medical History:   Diagnosis Date   • Allergic rhinitis due to allergen 11/02/2020   • Arthritis    • Elevated glucose 11/02/2020   • Elevated LFTs    • Essential hypertension 11/02/2020   • Foot pain, left 09/27/2018   • Foreign body in soft tissue 09/27/2018   • GERD (gastroesophageal reflux disease) 11/02/2020   • Heel pain    • HLD (hyperlipidemia)    • Hypertension    • Ingrowing toenail    • Numbness in feet        Past Surgical History:   Procedure Laterality Date   • COLONOSCOPY     • ENDOSCOPY     • US GUIDED FINE NEEDLE ASPIRATION  09/09/2019   • US GUIDED FINE NEEDLE ASPIRATION  09/10/2019       Family History   Problem Relation Age of Onset   • Diabetes Mother    • Diabetes Maternal Grandfather    • Hypertension Other    • Heart disease Other    • Malig Hyperthermia Neg Hx        Social History     Tobacco Use   • Smoking status: Never Smoker   • Smokeless tobacco: Never Used   Vaping Use   • Vaping Use: Never used   Substance Use Topics   • Alcohol use: Yes     Comment: Very Rarely   • Drug use: Never       Review of Systems: See HPI    Review of Systems            Medications Prior to Admission   Medication Sig Dispense Refill Last Dose   • clobetasol (TEMOVATE) 0.05 % external solution    Past Month at Unknown time   • losartan (COZAAR) 100 MG tablet Take 1 tablet by mouth Daily for 90 days. 90 tablet 0 4/20/2022 at Unknown time   •  metoprolol succinate XL (TOPROL-XL) 50 MG 24 hr tablet Take 1 tablet by mouth 2 (Two) Times a Day. 180 tablet 0 4/20/2022 at Unknown time   • polyethylene glycol (MIRALAX) 17 g packet Take as directed.  Instructions given in office.  Dispense: 238 g bottle 238 packet 0 4/21/2022 at Unknown time   • Smooth LAX 17 GM/SCOOP powder    4/21/2022 at Unknown time         Home Meds:      Prior to Admission medications    Medication Sig Start Date End Date Taking? Authorizing Provider   clobetasol (TEMOVATE) 0.05 % external solution  2/21/22  Yes ProviderWarren MD   losartan (COZAAR) 100 MG tablet Take 1 tablet by mouth Daily for 90 days. 4/18/22 7/17/22 Yes Dania Minor PA   metoprolol succinate XL (TOPROL-XL) 50 MG 24 hr tablet Take 1 tablet by mouth 2 (Two) Times a Day. 4/18/22  Yes Dania Minor PA   polyethylene glycol (MIRALAX) 17 g packet Take as directed.  Instructions given in office.  Dispense: 238 g bottle 2/22/22  Yes Aldo April, APRN   Smooth LAX 17 GM/SCOOP powder  3/1/22  Yes Provider, MD Warren            Allergies:  Latex      Objective     Vital Signs   Temp:  [97.2 °F (36.2 °C)] 97.2 °F (36.2 °C)  Heart Rate:  [76] 76  Resp:  [18] 18  BP: (130)/(82) 130/82    Physical Exam:     Physical Exam    NAD, A/O x 4, normal circulation, normal respiration      Result Review :     Assessment/Plan     Diagnoses and all orders for this visit:    1. Screening for malignant neoplasm of colon  Overview:  Added automatically from request for surgery [id number]    Orders:  -     sodium chloride 0.9 % flush 3 mL  -     sodium chloride 0.9 % flush 10 mL    Other orders  -     Follow Anesthesia Guidelines / Protocol; Standing  -     Verify NPO Status; Standing  -     Obtain Informed Consent; Standing  -     Verify Bowel Prep Was Successful; Standing  -     Give Tap Water Enema If Bowel Prep Insufficient; Standing  -     Insert Peripheral IV; Standing  -     Cancel: Saline Lock & Maintain IV  Access; Standing  -     Follow Anesthesia Guidelines / Protocol  -     Verify NPO Status  -     Obtain Informed Consent  -     Verify Bowel Prep Was Successful  -     Give Tap Water Enema If Bowel Prep Insufficient  -     Insert Peripheral IV  -     Cancel: Saline Lock & Maintain IV Access  -     POC Glucose Once; Standing  -     Insert Peripheral IV; Standing  -     Saline Lock & Maintain IV Access; Standing  -     lactated ringers infusion  -     POC Glucose Once  -     Insert Peripheral IV  -     Saline Lock & Maintain IV Access           Risks including bleeding, perforation, pain, infection, benefits, and alternatives of Colonoscopy discussed with patient.  All questions answered.  Consent verified.         Paolo Oliver MD  04/21/22 07:50 EDT

## 2022-04-21 NOTE — ANESTHESIA POSTPROCEDURE EVALUATION
Patient: Carlos Mcgregor    Procedure Summary     Date: 04/21/22 Room / Location: [facility] ENDOSCOPY 1 / [facility] ENDOSCOPY    Anesthesia Start: 0836 Anesthesia Stop: 0902    Procedure: COLONOSCOPY WITH POLYPECTOMY (N/A ) Diagnosis:       Screening for malignant neoplasm of colon      (Screening for malignant neoplasm of colon [Z12.11])    Surgeons: Paolo Oliver MD Provider: Chevy Steele MD    Anesthesia Type: general ASA Status: 2          Anesthesia Type: general    Vitals  Vitals Value Taken Time   /85 04/21/22 0915   Temp 36.4 °C (97.5 °F) 04/21/22 0905   Pulse 74 04/21/22 0918   Resp 18 04/21/22 0915   SpO2 97 % 04/21/22 0918   Vitals shown include unvalidated device data.        Post Anesthesia Care and Evaluation    Patient location during evaluation: bedside  Patient participation: complete - patient participated  Level of consciousness: awake  Pain management: adequate  Airway patency: patent  Anesthetic complications: No anesthetic complications  PONV Status: none  Cardiovascular status: acceptable and stable  Respiratory status: acceptable  Hydration status: acceptable    Comments: An Anesthesiologist personally participated in the most demanding procedures (including induction and emergence if applicable) in the anesthesia plan, monitored the course of anesthesia administration at frequent intervals and remained physically present and available for immediate diagnosis and treatment of emergencies.

## 2022-04-21 NOTE — DISCHARGE INSTRUCTIONS
You may resume normal activity on 4/22/22 at 0905am.  Stay away from greasy, gas producing, and spicy foods today.  Start off with bland foods.  Be near a bathroom when you eat, the bowel prep might still be working it's way through your body.  It is normal to find a little blood on your toilet paper.  IF YOU ARE PASSING BLOOD CLOTS OR FILLING THE TOILET, CALL 911 AND/OR GO TO YOUR NEAREST ER.    NO DRIVING, SIGNING LEGAL DOCUMENTS, OR ALCOHOL CONSUMPTION FOR 24 HOURS.

## 2022-04-22 ENCOUNTER — TELEPHONE (OUTPATIENT)
Dept: SURGERY | Facility: CLINIC | Age: 52
End: 2022-04-22

## 2022-04-22 LAB
CYTO UR: NORMAL
LAB AP CASE REPORT: NORMAL
LAB AP CLINICAL INFORMATION: NORMAL
PATH REPORT.FINAL DX SPEC: NORMAL
PATH REPORT.GROSS SPEC: NORMAL

## 2022-04-26 ENCOUNTER — TELEPHONE (OUTPATIENT)
Dept: FAMILY MEDICINE CLINIC | Facility: CLINIC | Age: 52
End: 2022-04-26

## 2022-05-10 ENCOUNTER — HOSPITAL ENCOUNTER (OUTPATIENT)
Dept: CARDIOLOGY | Facility: HOSPITAL | Age: 52
End: 2022-05-10

## 2022-05-12 ENCOUNTER — OFFICE VISIT (OUTPATIENT)
Dept: SURGERY | Facility: CLINIC | Age: 52
End: 2022-05-12

## 2022-05-12 VITALS — BODY MASS INDEX: 34.51 KG/M2 | HEIGHT: 72 IN | WEIGHT: 254.8 LBS | RESPIRATION RATE: 14 BRPM

## 2022-05-12 DIAGNOSIS — K63.5 POLYP OF COLON, UNSPECIFIED PART OF COLON, UNSPECIFIED TYPE: Primary | ICD-10-CM

## 2022-05-12 PROCEDURE — 99212 OFFICE O/P EST SF 10 MIN: CPT | Performed by: SURGERY

## 2022-05-12 RX ORDER — METOPROLOL SUCCINATE 100 MG/1
TABLET, EXTENDED RELEASE ORAL
COMMUNITY
Start: 2022-04-19 | End: 2022-06-23

## 2022-05-12 RX ORDER — MONTELUKAST SODIUM 10 MG/1
TABLET ORAL
COMMUNITY
Start: 2022-04-18 | End: 2022-05-16 | Stop reason: SDUPTHER

## 2022-05-12 NOTE — PROGRESS NOTES
General Surgery/Colorectal Surgery Note    Patient Name:  Carlos Mcgregor  YOB: 1970  [id number]    Referring Provider: No ref. provider found      Patient Care Team:  Dania Minor PA as PCP - General (Family Medicine)  Gege Dee PA-C as Consulting Physician (Dermatology)  Paolo Oliver MD as Consulting Physician (General Surgery)    Chief complaint follow-up endoscopy    Subjective .     History of present illness:    Status post screening colonoscopy 4/21/2022 with 4 mm polyp removed in the transverse colon.  Pathology with tubular adenoma.  He comes in for follow-up.  No changes since last seen.      History:  Past Medical History:   Diagnosis Date   • Allergic rhinitis due to allergen 11/02/2020   • Arthritis    • Elevated glucose 11/02/2020   • Elevated LFTs    • Essential hypertension 11/02/2020   • Foot pain, left 09/27/2018   • Foreign body in soft tissue 09/27/2018   • GERD (gastroesophageal reflux disease) 11/02/2020   • Heel pain    • HLD (hyperlipidemia)    • Hypertension    • Ingrowing toenail    • Numbness in feet        Past Surgical History:   Procedure Laterality Date   • COLONOSCOPY     • COLONOSCOPY N/A 4/21/2022    Procedure: COLONOSCOPY WITH POLYPECTOMY;  Surgeon: Paolo Oliver MD;  Location: [facility] ENDOSCOPY;  Service: General;  Laterality: N/A;  COLON POLYP   • ENDOSCOPY     • US GUIDED FINE NEEDLE ASPIRATION  09/09/2019   • US GUIDED FINE NEEDLE ASPIRATION  09/10/2019       Family History   Problem Relation Age of Onset   • Diabetes Mother    • Diabetes Maternal Grandfather    • Hypertension Other    • Heart disease Other    • Malig Hyperthermia Neg Hx        Social History     Tobacco Use   • Smoking status: Never Smoker   • Smokeless tobacco: Never Used   Vaping Use   • Vaping Use: Never used   Substance Use Topics   • Alcohol use: Yes     Comment: Very Rarely   • Drug use: Never       Review of Systems  All systems were reviewed and  negative except for:   Review of Systems   Constitutional: Negative for chills, fever and unexpected weight loss.   HENT: Negative for congestion, nosebleeds and voice change.    Eyes: Negative for blurred vision, double vision and discharge.   Respiratory: Negative for apnea, chest tightness and shortness of breath.    Cardiovascular: Negative for chest pain and leg swelling.   Gastrointestinal:        See HPI   Endocrine: Negative for cold intolerance and heat intolerance.   Genitourinary: Negative for dysuria, hematuria and urgency.   Musculoskeletal: Negative for back pain, joint swelling and neck pain.   Skin: Negative for color change and dry skin.   Neurological: Negative for dizziness and confusion.   Hematological: Negative for adenopathy.   Psychiatric/Behavioral: Negative for agitation and behavioral problems.     MEDS:  Prior to Admission medications    Medication Sig Start Date End Date Taking? Authorizing Provider   clobetasol (TEMOVATE) 0.05 % external solution  2/21/22  Yes Warren Fitzgerald MD   losartan (COZAAR) 100 MG tablet Take 1 tablet by mouth Daily for 90 days. 4/18/22 7/17/22 Yes Dania Minor PA   metoprolol succinate XL (TOPROL-XL) 50 MG 24 hr tablet Take 1 tablet by mouth 2 (Two) Times a Day. 4/18/22  Yes Dania Minor PA   montelukast (SINGULAIR) 10 MG tablet  4/18/22  Yes Warren Fitzgerald MD   metoprolol succinate XL (TOPROL-XL) 100 MG 24 hr tablet  4/19/22   ProviderWarren MD        Allergies:  Latex    Objective     Vital Signs   Resp:  [14] 14    Physical Exam:     General Appearance:    Alert, cooperative, in no acute distress   Head:    Normocephalic, without obvious abnormality, atraumatic   Eyes:          Conjunctivae and sclerae normal, no icterus,     Ears:    Ears appear intact with no abnormalities noted   Throat:   No oral lesions, no thrush, oral mucosa moist   Neck:   No adenopathy, supple, trachea midline, no thyromegaly   Back:     No  "kyphosis present, no scoliosis present, no skin lesions,      erythema or scars, no tenderness to percussion or                   palpation,   range of motion normal   Lungs:     Clear to auscultation,respirations regular, even and                  unlabored    Heart:    Regular rhythm and normal rate, normal S1 and S2, no            murmur, no gallop, no rub, no click   Chest Wall:    No abnormalities observed   Abdomen:     Normal bowel sounds, no masses, no organomegaly, soft        non-tender, non-distended, no guarding, no rebound                tenderness   Rectal:        Extremities:   Moves all extremities well, no edema, no cyanosis, no             redness   Pulses:   Pulses palpable and equal bilaterally   Skin:   No bleeding, bruising or rash   Lymph nodes:   No palpable adenopathy   Neurologic:   A/o x 4 with no deficits       Results Review:   {Results Review:93173::\"I reviewed the patient's new clinical results.\"    LABS/IMAGING:  Results for orders placed or performed during the hospital encounter of 04/21/22   Tissue Pathology Exam    Specimen: Large Intestine, Transverse Colon; Polyp   Result Value Ref Range    Case Report       Surgical Pathology Report                         Case: [id number]                                  Authorizing Provider:  Paolo Oliver MD  Collected:           04/21/2022 08:54 AM          Ordering Location:     [facility] Received:            04/21/2022 11:14 AM                                 SUITES                                                                       Pathologist:           Kristin Stephenson MD                                                     Specimen:    Large Intestine, Transverse Colon, TRANSVERSE COLON POLYP                                  Clinical Information      Final Diagnosis       Transverse colon polyp, biopsy:    - Tubular adenoma        Gross Description       1. Large Intestine, Transverse Colon.  Transverse " colon polyp: Received in formalin are fragments of pink soft tissue filtered measuring 0.5 cm in greatest aggregate dimension.  All 1A.  CRE          Microscopic Description          Result Review :     Assessment & Plan     Status post screening colonoscopy 4/21/2022 with 4 mm polyp removed in the transverse colon.  Pathology with tubular adenoma.    I reviewed the pathology with the patient.  Next colonoscopy in 5 years.  Follow-up me as needed.  All questions answered.  He agrees with plan.  Thank for the consult.              This document has been electronically signed by Paolo Oliver MD  May 12, 2022 14:13 EDT

## 2022-05-16 RX ORDER — MONTELUKAST SODIUM 10 MG/1
10 TABLET ORAL NIGHTLY
Qty: 90 TABLET | Refills: 1 | Status: SHIPPED | OUTPATIENT
Start: 2022-05-16 | End: 2022-06-23

## 2022-05-20 ENCOUNTER — HOSPITAL ENCOUNTER (OUTPATIENT)
Dept: CARDIOLOGY | Facility: HOSPITAL | Age: 52
Discharge: HOME | End: 2022-05-20
Admitting: PHYSICIAN ASSISTANT

## 2022-05-20 DIAGNOSIS — R00.0 TACHYCARDIA: ICD-10-CM

## 2022-05-20 PROCEDURE — 93225 XTRNL ECG REC<48 HRS REC: CPT

## 2022-06-03 LAB
MAXIMAL PREDICTED HEART RATE: 167 BPM
STRESS TARGET HR: 142 BPM

## 2022-06-03 PROCEDURE — 93227 XTRNL ECG REC<48 HR R&I: CPT | Performed by: INTERNAL MEDICINE

## 2022-06-06 ENCOUNTER — TELEPHONE (OUTPATIENT)
Dept: FAMILY MEDICINE CLINIC | Facility: CLINIC | Age: 52
End: 2022-06-06

## 2022-06-06 NOTE — TELEPHONE ENCOUNTER
----- Message from EVY Aparicio sent at 6/3/2022  3:49 PM EDT -----  Please notify dominikt: there were a few PACs and PVCs but otherwise a benign study.

## 2022-06-23 ENCOUNTER — OFFICE VISIT (OUTPATIENT)
Dept: FAMILY MEDICINE CLINIC | Facility: CLINIC | Age: 52
End: 2022-06-23

## 2022-06-23 VITALS
HEART RATE: 77 BPM | DIASTOLIC BLOOD PRESSURE: 82 MMHG | HEIGHT: 72 IN | WEIGHT: 254.4 LBS | BODY MASS INDEX: 34.46 KG/M2 | SYSTOLIC BLOOD PRESSURE: 130 MMHG | OXYGEN SATURATION: 97 %

## 2022-06-23 DIAGNOSIS — R73.09 ELEVATED GLUCOSE: Primary | ICD-10-CM

## 2022-06-23 DIAGNOSIS — Z76.0 ENCOUNTER FOR MEDICATION REFILL: ICD-10-CM

## 2022-06-23 DIAGNOSIS — I10 HYPERTENSION, UNSPECIFIED TYPE: Chronic | ICD-10-CM

## 2022-06-23 DIAGNOSIS — E66.09 CLASS 1 OBESITY DUE TO EXCESS CALORIES WITH SERIOUS COMORBIDITY AND BODY MASS INDEX (BMI) OF 34.0 TO 34.9 IN ADULT: ICD-10-CM

## 2022-06-23 PROCEDURE — 99214 OFFICE O/P EST MOD 30 MIN: CPT | Performed by: PHYSICIAN ASSISTANT

## 2022-06-23 RX ORDER — METOPROLOL SUCCINATE 50 MG/1
50 TABLET, EXTENDED RELEASE ORAL 2 TIMES DAILY
Qty: 180 TABLET | Refills: 1 | Status: SHIPPED | OUTPATIENT
Start: 2022-06-23 | End: 2022-12-12 | Stop reason: SDUPTHER

## 2022-06-23 RX ORDER — LOSARTAN POTASSIUM 100 MG/1
100 TABLET ORAL DAILY
Qty: 90 TABLET | Refills: 1 | Status: SHIPPED | OUTPATIENT
Start: 2022-06-23 | End: 2022-12-12 | Stop reason: SDUPTHER

## 2022-06-23 NOTE — PROGRESS NOTES
Chief Complaint  Chief Complaint   Patient presents with   • Hypertension   • Hyperglycemia       Subjective          Carlos Mcgregor presents to [facility]  History of Present Illness    Patient is here today for a 6 month follow up for HTN and Hyperglycemia. Patient has no complaints today and needs no refills, however he requests that he can get refills for more than just 30 days at a time.     HTN: Patient presents for hypertension management. Patient is currently taking Losartan 100MG and Metoprolol 50mg twice daily and is consistent with medication. Patient denies chest pain, shortness of air and edema. Patient does not check blood pressure at home. Today it is 141/97; manual recheck was 130/82. Patient's tachycardia has improved with twice daily dosing of metoprolol.    Patient did not get his labs done at last appt.    Colon: 4/21/22    Medical History: has a past medical history of Allergic rhinitis due to allergen (11/02/2020), Arthritis, Elevated glucose (11/02/2020), Elevated LFTs, Essential hypertension (11/02/2020), Foot pain, left (09/27/2018), Foreign body in soft tissue (09/27/2018), GERD (gastroesophageal reflux disease) (11/02/2020), Heel pain, HLD (hyperlipidemia), Hypertension, Ingrowing toenail, and Numbness in feet.   Surgical History: has a past surgical history that includes US Guided Fine Needle Aspiration (09/09/2019); US Guided Fine Needle Aspiration (09/10/2019); Colonoscopy; Esophagogastroduodenoscopy; and Colonoscopy (N/A, 4/21/2022).   Family History: family history includes Diabetes in his maternal grandfather and mother; Heart disease in an other family member; Hypertension in an other family member.   Social History: reports that he has never smoked. He has never used smokeless tobacco. He reports current alcohol use. He reports that he does not use drugs.    Allergies: Latex    Health Maintenance Due   Topic Date Due   • ANNUAL PHYSICAL  Never  "done       Immunization History   Administered Date(s) Administered   • COVID-19 (PFIZER) PURPLE CAP 05/14/2021, 09/23/2021   • Influenza, Unspecified 10/02/2020   • Tdap 09/25/2018       Objective     Vitals:    06/23/22 1142 06/23/22 1200   BP: 141/97 130/82   Pulse: 77    SpO2: 97%    Weight: 115 kg (254 lb 6.4 oz)    Height: 182.9 cm (72\")      Body mass index is 34.5 kg/m².     Wt Readings from Last 3 Encounters:   06/23/22 115 kg (254 lb 6.4 oz)   05/12/22 116 kg (254 lb 12.8 oz)   04/21/22 113 kg (249 lb 1.9 oz)     BP Readings from Last 3 Encounters:   06/23/22 130/82   04/21/22 122/86   04/18/22 138/88       BMI is >= 30 and <35. (Class 1 Obesity). The following options were offered after discussion;: weight loss educational material (shared in after visit summary)      Patient Care Team:  Dania Minor PA as PCP - General (Family Medicine)  Gege Dee PA-C as Consulting Physician (Dermatology)  Paolo Oliver MD as Consulting Physician (General Surgery)    Physical Exam  Vitals and nursing note reviewed.   Constitutional:       Appearance: Normal appearance. He is obese.   HENT:      Head: Normocephalic and atraumatic.   Neck:      Vascular: No carotid bruit.      Comments: Thyroid : gland size normal, nontender, no nodules or masses present on palpation   Cardiovascular:      Rate and Rhythm: Normal rate and regular rhythm.      Pulses: Normal pulses.      Heart sounds: Normal heart sounds.   Pulmonary:      Effort: Pulmonary effort is normal.      Breath sounds: Normal breath sounds.   Musculoskeletal:      Cervical back: Neck supple. No tenderness.      Right lower leg: No edema.      Left lower leg: No edema.   Lymphadenopathy:      Cervical: No cervical adenopathy.   Neurological:      Mental Status: He is alert.   Psychiatric:         Mood and Affect: Mood normal.         Behavior: Behavior normal.           Result Review :                           Assessment and Plan  "     Diagnoses and all orders for this visit:    1. Elevated glucose (Primary)  Comments:  Unsure of stability; labs not checked as directed at last office visit.    2. Hypertension, unspecified type  Comments:  Stable; continue with losartan 100mg and Toprol XL 50mg twice daily; check labs and follow up in 6mth.  Orders:  -     losartan (COZAAR) 100 MG tablet; Take 1 tablet by mouth Daily.  Dispense: 90 tablet; Refill: 1  -     metoprolol succinate XL (TOPROL-XL) 50 MG 24 hr tablet; Take 1 tablet by mouth 2 (Two) Times a Day.  Dispense: 180 tablet; Refill: 1    3. Encounter for medication refill    4. Class 1 obesity due to excess calories with serious comorbidity and body mass index (BMI) of 34.0 to 34.9 in adult            Follow Up     Return in about 6 months (around 12/23/2022).    Patient was given instructions and counseling regarding his condition or for health maintenance advice. Please see specific information pulled into the AVS if appropriate.

## 2022-06-24 PROBLEM — E66.09 CLASS 1 OBESITY DUE TO EXCESS CALORIES WITH SERIOUS COMORBIDITY AND BODY MASS INDEX (BMI) OF 34.0 TO 34.9 IN ADULT: Status: ACTIVE | Noted: 2022-06-24

## 2022-06-24 PROBLEM — E66.811 CLASS 1 OBESITY DUE TO EXCESS CALORIES WITH SERIOUS COMORBIDITY AND BODY MASS INDEX (BMI) OF 34.0 TO 34.9 IN ADULT: Status: ACTIVE | Noted: 2022-06-24

## 2022-07-15 DIAGNOSIS — I10 HYPERTENSION, UNSPECIFIED TYPE: Chronic | ICD-10-CM

## 2022-07-15 RX ORDER — LOSARTAN POTASSIUM 100 MG/1
100 TABLET ORAL DAILY
Qty: 90 TABLET | Refills: 1 | OUTPATIENT
Start: 2022-07-15

## 2022-07-27 DIAGNOSIS — I10 HYPERTENSION, UNSPECIFIED TYPE: Chronic | ICD-10-CM

## 2022-07-27 DIAGNOSIS — Z76.0 ENCOUNTER FOR MEDICATION REFILL: ICD-10-CM

## 2022-07-27 RX ORDER — METOPROLOL SUCCINATE 100 MG/1
TABLET, EXTENDED RELEASE ORAL
Qty: 90 TABLET | OUTPATIENT
Start: 2022-07-27

## 2022-11-28 RX ORDER — MONTELUKAST SODIUM 10 MG/1
10 TABLET ORAL NIGHTLY
Qty: 90 TABLET | Refills: 1 | OUTPATIENT
Start: 2022-11-28

## 2022-12-08 NOTE — PROGRESS NOTES
Chief Complaint  Chief Complaint   Patient presents with   • Follow-up   • Hypertension       Subjective          Carlos Mcgregor presents to [facility] for 6 month f/u for HTN.     History of Present Illness    HTN: Patient presents for hypertension management. Patient is currently taking Losartan and Metoprolol and is consistent with medication. Patient denies chest pain, shortness of air. Patient does complain of edema, worse with flying--patient has increased his water intake to help with symptoms. Patient checks blood pressure at home. (145-130s/90's-102)    Pt states today that he is concerned with his testosterone levels and would like to have them checked. Pt states he has no sex drive and decreased energy levels.     Pt also c/o frequent urination at night. History of blood glucose being elevated. No recent check. Patient denies increased hunger or thirst.    No labs in the past year; labs were ordered by not done by the patient.    Colon: 4-21-22    Medical History: has a past medical history of Allergic rhinitis due to allergen (11/02/2020), Arthritis, Elevated glucose (11/02/2020), Elevated LFTs, Essential hypertension (11/02/2020), Foot pain, left (09/27/2018), Foreign body in soft tissue (09/27/2018), GERD (gastroesophageal reflux disease) (11/02/2020), Heel pain, HLD (hyperlipidemia), Hypertension, Ingrowing toenail, and Numbness in feet.   Surgical History: has a past surgical history that includes US Guided Fine Needle Aspiration (09/09/2019); US Guided Fine Needle Aspiration (09/10/2019); Colonoscopy; Esophagogastroduodenoscopy; and Colonoscopy (N/A, 4/21/2022).   Family History: family history includes Diabetes in his maternal grandfather and mother; Heart disease in an other family member; Hypertension in an other family member.   Social History: reports that he has never smoked. He has never used smokeless tobacco. He reports current alcohol use. He reports  "that he does not use drugs.    Allergies: Latex    Health Maintenance Due   Topic Date Due   • LIPID PANEL  06/25/2022       Immunization History   Administered Date(s) Administered   • COVID-19 (PFIZER) PURPLE CAP 05/14/2021, 09/23/2021   • FluLaval/Fluzone >6mos 12/12/2022   • Influenza, Unspecified 10/02/2020   • Tdap 09/25/2018       Objective     Vitals:    12/12/22 1014 12/12/22 1035   BP: (!) 139/102 130/92   Pulse: 80    Resp: 18    SpO2: 96%    Weight: 120 kg (265 lb 3.2 oz)    Height: 182.9 cm (72\")      Body mass index is 35.97 kg/m².     Wt Readings from Last 3 Encounters:   12/12/22 120 kg (265 lb 3.2 oz)   06/23/22 115 kg (254 lb 6.4 oz)   05/12/22 116 kg (254 lb 12.8 oz)     BP Readings from Last 3 Encounters:   12/12/22 130/92   06/23/22 130/82   04/21/22 122/86       BMI is >= 30 and <35. (Class 1 Obesity). The following options were offered after discussion;: exercise counseling/recommendations and nutrition counseling/recommendations      Patient Care Team:  Dania Minor PA as PCP - General (Family Medicine)  Gege Dee PA-C as Consulting Physician (Dermatology)  Paolo Oliver MD as Consulting Physician (General Surgery)    Physical Exam  Vitals and nursing note reviewed.   Constitutional:       Appearance: Normal appearance. He is obese.   HENT:      Head: Normocephalic and atraumatic.   Neck:      Vascular: No carotid bruit.      Comments: Thyroid : gland size normal, nontender, no nodules or masses present on palpation   Cardiovascular:      Rate and Rhythm: Normal rate and regular rhythm.      Pulses: Normal pulses.      Heart sounds: Normal heart sounds.   Pulmonary:      Effort: Pulmonary effort is normal.      Breath sounds: Normal breath sounds.   Musculoskeletal:      Cervical back: Neck supple. No tenderness.      Right lower leg: Edema present.      Left lower leg: Edema present.      Comments: Trace bilateral edema   Lymphadenopathy:      Cervical: No cervical " adenopathy.   Neurological:      Mental Status: He is alert.   Psychiatric:         Mood and Affect: Mood normal.         Behavior: Behavior normal.           Result Review :                           Assessment and Plan      Diagnoses and all orders for this visit:    1. Need for influenza vaccination (Primary)  -     FluLaval/Fluzone >6 mos ([id number])    2. Other fatigue  Comments:  New symptom: Check labs to further evaluate  Orders:  -     Testosterone; Future    3. Nocturia  Comments:  New symptom: Check labs and urine to further evaluate follow-up in 6 weeks  Orders:  -     Urinalysis With Culture If Indicated -; Future    4. Edema, unspecified type  Comments:  New uncontrolled finding on exam: Start HCTZ 25 Mg once daily follow-up in 6 weeks; continue with compression socks  Orders:  -     hydroCHLOROthiazide (HYDRODIURIL) 25 MG tablet; Take 1 tablet by mouth Daily.  Dispense: 90 tablet; Refill: 0    5. Class 2 severe obesity due to excess calories with serious comorbidity and body mass index (BMI) of 35.0 to 35.9 in adult (HCC)    6. Hypertension, unspecified type  Comments:  Not stable; continue with losartan 100mg and Toprol XL 50mg twice daily; add HCTZ 25 Mg daily, check labs and follow up in 6 weeks.  Orders:  -     losartan (COZAAR) 100 MG tablet; Take 1 tablet by mouth Daily.  Dispense: 90 tablet; Refill: 1  -     metoprolol succinate XL (TOPROL-XL) 50 MG 24 hr tablet; Take 1 tablet by mouth 2 (Two) Times a Day.  Dispense: 180 tablet; Refill: 1            Follow Up     Return in about 6 weeks (around 1/23/2023).    Patient was given instructions and counseling regarding his condition or for health maintenance advice. Please see specific information pulled into the AVS if appropriate.

## 2022-12-12 ENCOUNTER — LAB (OUTPATIENT)
Dept: LAB | Facility: HOSPITAL | Age: 52
End: 2022-12-12

## 2022-12-12 ENCOUNTER — OFFICE VISIT (OUTPATIENT)
Dept: FAMILY MEDICINE CLINIC | Facility: CLINIC | Age: 52
End: 2022-12-12

## 2022-12-12 VITALS
HEART RATE: 80 BPM | RESPIRATION RATE: 18 BRPM | WEIGHT: 265.2 LBS | BODY MASS INDEX: 35.92 KG/M2 | HEIGHT: 72 IN | OXYGEN SATURATION: 96 % | DIASTOLIC BLOOD PRESSURE: 92 MMHG | SYSTOLIC BLOOD PRESSURE: 130 MMHG

## 2022-12-12 DIAGNOSIS — R53.83 OTHER FATIGUE: ICD-10-CM

## 2022-12-12 DIAGNOSIS — I10 HYPERTENSION, UNSPECIFIED TYPE: Chronic | ICD-10-CM

## 2022-12-12 DIAGNOSIS — Z23 NEED FOR INFLUENZA VACCINATION: Primary | ICD-10-CM

## 2022-12-12 DIAGNOSIS — I10 HYPERTENSION, UNSPECIFIED TYPE: ICD-10-CM

## 2022-12-12 DIAGNOSIS — R35.1 NOCTURIA: ICD-10-CM

## 2022-12-12 DIAGNOSIS — R60.9 EDEMA, UNSPECIFIED TYPE: ICD-10-CM

## 2022-12-12 DIAGNOSIS — E66.01 CLASS 2 SEVERE OBESITY DUE TO EXCESS CALORIES WITH SERIOUS COMORBIDITY AND BODY MASS INDEX (BMI) OF 35.0 TO 35.9 IN ADULT: ICD-10-CM

## 2022-12-12 DIAGNOSIS — R73.09 ELEVATED GLUCOSE: ICD-10-CM

## 2022-12-12 PROBLEM — E66.812 CLASS 2 SEVERE OBESITY DUE TO EXCESS CALORIES WITH SERIOUS COMORBIDITY AND BODY MASS INDEX (BMI) OF 35.0 TO 35.9 IN ADULT: Status: ACTIVE | Noted: 2022-06-24

## 2022-12-12 LAB
ALBUMIN SERPL-MCNC: 4.3 G/DL (ref 3.5–5.2)
ALBUMIN/GLOB SERPL: 1.6 G/DL
ALP SERPL-CCNC: 80 U/L (ref 39–117)
ALT SERPL W P-5'-P-CCNC: 86 U/L (ref 1–41)
ANION GAP SERPL CALCULATED.3IONS-SCNC: 12.5 MMOL/L (ref 5–15)
AST SERPL-CCNC: 32 U/L (ref 1–40)
BASOPHILS # BLD AUTO: 0.02 10*3/MM3 (ref 0–0.2)
BASOPHILS NFR BLD AUTO: 0.3 % (ref 0–1.5)
BILIRUB SERPL-MCNC: 0.5 MG/DL (ref 0–1.2)
BILIRUB UR QL STRIP: NEGATIVE
BUN SERPL-MCNC: 16 MG/DL (ref 6–20)
BUN/CREAT SERPL: 20.3 (ref 7–25)
CALCIUM SPEC-SCNC: 9.1 MG/DL (ref 8.6–10.5)
CHLORIDE SERPL-SCNC: 104 MMOL/L (ref 98–107)
CHOLEST SERPL-MCNC: 191 MG/DL (ref 0–200)
CLARITY UR: CLEAR
CO2 SERPL-SCNC: 22.5 MMOL/L (ref 22–29)
COLOR UR: YELLOW
CREAT SERPL-MCNC: 0.79 MG/DL (ref 0.76–1.27)
DEPRECATED RDW RBC AUTO: 39.2 FL (ref 37–54)
EGFRCR SERPLBLD CKD-EPI 2021: 105.6 ML/MIN/1.73
EOSINOPHIL # BLD AUTO: 0.1 10*3/MM3 (ref 0–0.4)
EOSINOPHIL NFR BLD AUTO: 1.6 % (ref 0.3–6.2)
ERYTHROCYTE [DISTWIDTH] IN BLOOD BY AUTOMATED COUNT: 12.7 % (ref 12.3–15.4)
GLOBULIN UR ELPH-MCNC: 2.7 GM/DL
GLUCOSE SERPL-MCNC: 115 MG/DL (ref 65–99)
GLUCOSE UR STRIP-MCNC: NEGATIVE MG/DL
HBA1C MFR BLD: 6 % (ref 4.8–5.6)
HCT VFR BLD AUTO: 44.4 % (ref 37.5–51)
HDLC SERPL-MCNC: 32 MG/DL (ref 40–60)
HGB BLD-MCNC: 15.5 G/DL (ref 13–17.7)
HGB UR QL STRIP.AUTO: NEGATIVE
IMM GRANULOCYTES # BLD AUTO: 0.03 10*3/MM3 (ref 0–0.05)
IMM GRANULOCYTES NFR BLD AUTO: 0.5 % (ref 0–0.5)
KETONES UR QL STRIP: NEGATIVE
LDLC SERPL CALC-MCNC: 128 MG/DL (ref 0–100)
LDLC/HDLC SERPL: 3.88 {RATIO}
LEUKOCYTE ESTERASE UR QL STRIP.AUTO: NEGATIVE
LYMPHOCYTES # BLD AUTO: 1.56 10*3/MM3 (ref 0.7–3.1)
LYMPHOCYTES NFR BLD AUTO: 25 % (ref 19.6–45.3)
MCH RBC QN AUTO: 30.2 PG (ref 26.6–33)
MCHC RBC AUTO-ENTMCNC: 34.9 G/DL (ref 31.5–35.7)
MCV RBC AUTO: 86.4 FL (ref 79–97)
MONOCYTES # BLD AUTO: 0.64 10*3/MM3 (ref 0.1–0.9)
MONOCYTES NFR BLD AUTO: 10.3 % (ref 5–12)
NEUTROPHILS NFR BLD AUTO: 3.89 10*3/MM3 (ref 1.7–7)
NEUTROPHILS NFR BLD AUTO: 62.3 % (ref 42.7–76)
NITRITE UR QL STRIP: NEGATIVE
NRBC BLD AUTO-RTO: 0 /100 WBC (ref 0–0.2)
PH UR STRIP.AUTO: 5.5 [PH] (ref 5–8)
PLATELET # BLD AUTO: 224 10*3/MM3 (ref 140–450)
PMV BLD AUTO: 10 FL (ref 6–12)
POTASSIUM SERPL-SCNC: 4.1 MMOL/L (ref 3.5–5.2)
PROT SERPL-MCNC: 7 G/DL (ref 6–8.5)
PROT UR QL STRIP: NEGATIVE
RBC # BLD AUTO: 5.14 10*6/MM3 (ref 4.14–5.8)
SODIUM SERPL-SCNC: 139 MMOL/L (ref 136–145)
SP GR UR STRIP: 1.02 (ref 1–1.03)
TESTOST SERPL-MCNC: 318 NG/DL (ref 193–740)
TRIGL SERPL-MCNC: 174 MG/DL (ref 0–150)
TSH SERPL DL<=0.05 MIU/L-ACNC: 1.06 UIU/ML (ref 0.27–4.2)
UROBILINOGEN UR QL STRIP: NORMAL
VLDLC SERPL-MCNC: 31 MG/DL (ref 5–40)
WBC NRBC COR # BLD: 6.24 10*3/MM3 (ref 3.4–10.8)

## 2022-12-12 PROCEDURE — 80061 LIPID PANEL: CPT

## 2022-12-12 PROCEDURE — 83036 HEMOGLOBIN GLYCOSYLATED A1C: CPT

## 2022-12-12 PROCEDURE — 84403 ASSAY OF TOTAL TESTOSTERONE: CPT

## 2022-12-12 PROCEDURE — 90686 IIV4 VACC NO PRSV 0.5 ML IM: CPT | Performed by: PHYSICIAN ASSISTANT

## 2022-12-12 PROCEDURE — 36415 COLL VENOUS BLD VENIPUNCTURE: CPT

## 2022-12-12 PROCEDURE — 80050 GENERAL HEALTH PANEL: CPT

## 2022-12-12 PROCEDURE — 90471 IMMUNIZATION ADMIN: CPT | Performed by: PHYSICIAN ASSISTANT

## 2022-12-12 PROCEDURE — 81003 URINALYSIS AUTO W/O SCOPE: CPT

## 2022-12-12 PROCEDURE — 99214 OFFICE O/P EST MOD 30 MIN: CPT | Performed by: PHYSICIAN ASSISTANT

## 2022-12-12 RX ORDER — METOPROLOL SUCCINATE 50 MG/1
50 TABLET, EXTENDED RELEASE ORAL 2 TIMES DAILY
Qty: 180 TABLET | Refills: 1 | Status: SHIPPED | OUTPATIENT
Start: 2022-12-12

## 2022-12-12 RX ORDER — HYDROCHLOROTHIAZIDE 25 MG/1
25 TABLET ORAL DAILY
Qty: 90 TABLET | Refills: 0 | Status: SHIPPED | OUTPATIENT
Start: 2022-12-12 | End: 2023-01-27 | Stop reason: SDUPTHER

## 2022-12-12 RX ORDER — LOSARTAN POTASSIUM 100 MG/1
100 TABLET ORAL DAILY
Qty: 90 TABLET | Refills: 1 | Status: SHIPPED | OUTPATIENT
Start: 2022-12-12

## 2022-12-21 DIAGNOSIS — R74.01 ELEVATED ALT MEASUREMENT: Primary | ICD-10-CM

## 2023-01-13 ENCOUNTER — HOSPITAL ENCOUNTER (OUTPATIENT)
Dept: ULTRASOUND IMAGING | Facility: HOSPITAL | Age: 53
Discharge: HOME | End: 2023-01-13
Admitting: PHYSICIAN ASSISTANT
Payer: COMMERCIAL

## 2023-01-13 DIAGNOSIS — R74.01 ELEVATED ALT MEASUREMENT: ICD-10-CM

## 2023-01-13 PROCEDURE — 76705 ECHO EXAM OF ABDOMEN: CPT

## 2023-01-19 DIAGNOSIS — K76.0 HEPATIC STEATOSIS: Primary | ICD-10-CM

## 2023-01-26 NOTE — PROGRESS NOTES
Chief Complaint  Chief Complaint   Patient presents with   • Hypertension   • Follow-up       Subjective          Carlos Mcgregor presents to [facility] for 6 week follow up for HTN.     History of Present Illness    HTN: Patient presents for hypertension management. Patient is currently taking Losartan, Metoprolol, and HCTZ (started at last office visit secondary to edema) and is consistent with medication. Patient denies chest pain, shortness of air and edema. Edema resolved with HCTZ.    After reviewing lab work and seeing that he was prediabetic, patient changed diet drastically.  Patient is now fasting 24 hours from Sunday to Monday per week, he is tracking blood sugar which is gradually decreased down to the 90s in the morning.  Patient has increased water and decreased carbs.  Patient plans on starting cardio into his regimen and later adding weight training into his regimen.  Patient has decreased his Toprol-XL on his own to 50 mg once daily instead of 50 mg twice daily.  Blood pressure is good today at 118/88.    ED: Patient does state that erectile dysfunction is still an issue.  Patient states that he can attain erection but has difficulty sustaining an erection.  Patient would like to try medication.  Testosterone was checked with last labs which was normal.    Colon: 4/21/22    Medical History: has a past medical history of Allergic rhinitis due to allergen (11/02/2020), Arthritis, Elevated glucose (11/02/2020), Elevated LFTs, Essential hypertension (11/02/2020), Foot pain, left (09/27/2018), Foreign body in soft tissue (09/27/2018), GERD (gastroesophageal reflux disease) (11/02/2020), Heel pain, HLD (hyperlipidemia), Hypertension, Ingrowing toenail, and Numbness in feet.   Surgical History: has a past surgical history that includes US Guided Fine Needle Aspiration (09/09/2019); US Guided Fine Needle Aspiration (09/10/2019); Colonoscopy; Esophagogastroduodenoscopy;  "and Colonoscopy (N/A, 4/21/2022).   Family History: family history includes Diabetes in his maternal grandfather and mother; Heart disease in an other family member; Hypertension in an other family member.   Social History: reports that he has never smoked. He has never used smokeless tobacco. He reports current alcohol use. He reports that he does not use drugs.    Allergies: Latex    Health Maintenance Due   Topic Date Due   • ANNUAL PHYSICAL  Never done   • COVID-19 Vaccine (3 - Booster for Pfizer series) 11/18/2021       Immunization History   Administered Date(s) Administered   • COVID-19 (PFIZER) PURPLE CAP 05/14/2021, 09/23/2021   • FluLaval/Fluzone >6mos 12/12/2022   • Influenza, Unspecified 10/02/2020, 12/12/2022   • Pneumococcal Conjugate 20-Valent (PCV20) 01/27/2023   • Tdap 09/25/2018       Objective     Vitals:    01/27/23 0932   BP: 118/88   BP Location: Right arm   Patient Position: Sitting   Cuff Size: Adult   Pulse: 80   Resp: 18   SpO2: 98%   Weight: 110 kg (242 lb 9.6 oz)   Height: 182.9 cm (72\")     Body mass index is 32.9 kg/m².     Wt Readings from Last 3 Encounters:   01/27/23 110 kg (242 lb 9.6 oz)   12/12/22 120 kg (265 lb 3.2 oz)   06/23/22 115 kg (254 lb 6.4 oz)     BP Readings from Last 3 Encounters:   01/27/23 118/88   12/12/22 130/92   06/23/22 130/82       Class 2 Severe Obesity (BMI >=35 and <=39.9). Obesity-related health conditions include the following: hypertension. Obesity is improving with lifestyle modifications. BMI is is above average; BMI management plan is completed. We discussed portion control and increasing exercise.      Patient Care Team:  Dania Minor PA as PCP - General (Family Medicine)  Gege Dee PA-C as Consulting Physician (Dermatology)  Paolo Oliver MD as Consulting Physician (General Surgery)    Physical Exam  Vitals and nursing note reviewed.   Constitutional:       Appearance: Normal appearance. He is obese.   HENT:      Head: " Normocephalic and atraumatic.   Neck:      Vascular: No carotid bruit.      Comments: Thyroid : gland size normal, nontender, no nodules or masses present on palpation   Cardiovascular:      Rate and Rhythm: Normal rate and regular rhythm.      Pulses: Normal pulses.      Heart sounds: Normal heart sounds.   Pulmonary:      Effort: Pulmonary effort is normal.      Breath sounds: Normal breath sounds.   Musculoskeletal:      Cervical back: Neck supple. No tenderness.      Right lower leg: No edema.      Left lower leg: No edema.   Lymphadenopathy:      Cervical: No cervical adenopathy.   Neurological:      Mental Status: He is alert.   Psychiatric:         Mood and Affect: Mood normal.         Behavior: Behavior normal.           Result Review :         Common labs    Common Labs 12/12/22 12/12/22 12/12/22 12/12/22    1058 1058 1058 1058   Glucose   115 (A)    BUN   16    Creatinine   0.79    Sodium   139    Potassium   4.1    Chloride   104    Calcium   9.1    Albumin   4.30    Total Bilirubin   0.5    Alkaline Phosphatase   80    AST (SGOT)   32    ALT (SGPT)   86 (A)    WBC 6.24      Hemoglobin 15.5      Hematocrit 44.4      Platelets 224      Total Cholesterol    191   Triglycerides    174 (A)   HDL Cholesterol    32 (A)   LDL Cholesterol     128 (A)   Hemoglobin A1C  6.00 (A)     (A) Abnormal value                            Assessment and Plan      Diagnoses and all orders for this visit:    1. Need for pneumococcal vaccination (Primary)  -     Pneumococcal Conjugate Vaccine 20-Valent (PCV20)    2. Prediabetes  Comments:  Discussed with patient at visit: Patient has changed diet and will start exercise soon.  Continue and follow-up in 5 months  Orders:  -     Comprehensive Metabolic Panel; Future  -     Hemoglobin A1c; Future    3. Hyperlipidemia, unspecified hyperlipidemia type  Comments:  Elevated with last labs: Patient is changed diet and is working on exercise; recheck again at next visit  Orders:  -      Comprehensive Metabolic Panel; Future  -     Lipid Panel; Future    4. Erectile dysfunction, unspecified erectile dysfunction type  Comments:  New problem: Trial of Viagra 50 mg once daily prior to sexual activity as needed; admin and side effects discussed.  Orders:  -     sildenafil (Viagra) 50 MG tablet; Take 1 tablet by mouth Daily As Needed for Erectile Dysfunction.  Dispense: 10 tablet; Refill: 2    5. Edema, unspecified type  Comments:  Improved with HCTZ 25 Mg once daily; follow-up in 5 months  Orders:  -     hydroCHLOROthiazide (HYDRODIURIL) 25 MG tablet; Take 1 tablet by mouth Daily.  Dispense: 90 tablet; Refill: 0    6. Class 1 obesity due to excess calories with serious comorbidity and body mass index (BMI) of 32.0 to 32.9 in adult    7. Essential hypertension  Comments:  Stable on Toprol XL 50 Mg once daily and losartan 100 Mg daily, follow-up in 6 months        I spent 32 minutes caring for Carlos on this date of service. This time includes time spent by me in the following activities:preparing for the visit, reviewing tests, obtaining and/or reviewing a separately obtained history, performing a medically appropriate examination and/or evaluation , counseling and educating the patient/family/caregiver, ordering medications, tests, or procedures, documenting information in the medical record and care coordination    Follow Up     Return in about 5 months (around 6/27/2023).    Patient was given instructions and counseling regarding his condition or for health maintenance advice. Please see specific information pulled into the AVS if appropriate.

## 2023-01-27 ENCOUNTER — OFFICE VISIT (OUTPATIENT)
Dept: FAMILY MEDICINE CLINIC | Facility: CLINIC | Age: 53
End: 2023-01-27
Payer: COMMERCIAL

## 2023-01-27 VITALS
OXYGEN SATURATION: 98 % | HEIGHT: 72 IN | BODY MASS INDEX: 32.86 KG/M2 | DIASTOLIC BLOOD PRESSURE: 88 MMHG | RESPIRATION RATE: 18 BRPM | SYSTOLIC BLOOD PRESSURE: 118 MMHG | WEIGHT: 242.6 LBS | HEART RATE: 80 BPM

## 2023-01-27 DIAGNOSIS — R60.9 EDEMA, UNSPECIFIED TYPE: ICD-10-CM

## 2023-01-27 DIAGNOSIS — Z23 NEED FOR PNEUMOCOCCAL VACCINATION: Primary | ICD-10-CM

## 2023-01-27 DIAGNOSIS — N52.9 ERECTILE DYSFUNCTION, UNSPECIFIED ERECTILE DYSFUNCTION TYPE: Chronic | ICD-10-CM

## 2023-01-27 DIAGNOSIS — E78.5 HYPERLIPIDEMIA, UNSPECIFIED HYPERLIPIDEMIA TYPE: Chronic | ICD-10-CM

## 2023-01-27 DIAGNOSIS — R73.03 PREDIABETES: ICD-10-CM

## 2023-01-27 DIAGNOSIS — I10 ESSENTIAL HYPERTENSION: Chronic | ICD-10-CM

## 2023-01-27 DIAGNOSIS — E66.09 CLASS 1 OBESITY DUE TO EXCESS CALORIES WITH SERIOUS COMORBIDITY AND BODY MASS INDEX (BMI) OF 32.0 TO 32.9 IN ADULT: ICD-10-CM

## 2023-01-27 PROCEDURE — 90677 PCV20 VACCINE IM: CPT | Performed by: PHYSICIAN ASSISTANT

## 2023-01-27 PROCEDURE — 99214 OFFICE O/P EST MOD 30 MIN: CPT | Performed by: PHYSICIAN ASSISTANT

## 2023-01-27 PROCEDURE — 90471 IMMUNIZATION ADMIN: CPT | Performed by: PHYSICIAN ASSISTANT

## 2023-01-27 RX ORDER — SILDENAFIL 50 MG/1
50 TABLET, FILM COATED ORAL DAILY PRN
Qty: 10 TABLET | Refills: 2 | Status: SHIPPED | OUTPATIENT
Start: 2023-01-27

## 2023-01-27 RX ORDER — HYDROCHLOROTHIAZIDE 25 MG/1
25 TABLET ORAL DAILY
Qty: 90 TABLET | Refills: 0 | Status: SHIPPED | OUTPATIENT
Start: 2023-01-27

## 2023-04-27 DIAGNOSIS — R60.9 EDEMA, UNSPECIFIED TYPE: ICD-10-CM

## 2023-04-27 RX ORDER — HYDROCHLOROTHIAZIDE 25 MG/1
25 TABLET ORAL DAILY
Qty: 90 TABLET | Refills: 0 | Status: SHIPPED | OUTPATIENT
Start: 2023-04-27

## 2023-05-30 PROCEDURE — 87205 SMEAR GRAM STAIN: CPT | Performed by: STUDENT IN AN ORGANIZED HEALTH CARE EDUCATION/TRAINING PROGRAM

## 2023-05-30 PROCEDURE — 87070 CULTURE OTHR SPECIMN AEROBIC: CPT | Performed by: STUDENT IN AN ORGANIZED HEALTH CARE EDUCATION/TRAINING PROGRAM

## 2023-06-03 ENCOUNTER — TELEPHONE (OUTPATIENT)
Dept: URGENT CARE | Facility: CLINIC | Age: 53
End: 2023-06-03
Payer: COMMERCIAL

## 2023-06-03 ENCOUNTER — TELEPHONE (OUTPATIENT)
Dept: URGENT CARE | Facility: CLINIC | Age: 53
End: 2023-06-03

## 2023-06-03 NOTE — TELEPHONE ENCOUNTER
----- Message from Remy Ngo MD sent at 6/2/2023 10:42 AM EDT -----  Please call the patient regarding negative wound culture - if area does not improve follow-up with primary

## 2023-06-06 PROCEDURE — 87205 SMEAR GRAM STAIN: CPT | Performed by: FAMILY MEDICINE

## 2023-06-06 PROCEDURE — 87070 CULTURE OTHR SPECIMN AEROBIC: CPT | Performed by: FAMILY MEDICINE

## 2023-06-19 ENCOUNTER — OFFICE VISIT (OUTPATIENT)
Dept: GASTROENTEROLOGY | Facility: CLINIC | Age: 53
End: 2023-06-19
Payer: COMMERCIAL

## 2023-06-19 VITALS
DIASTOLIC BLOOD PRESSURE: 88 MMHG | WEIGHT: 224 LBS | BODY MASS INDEX: 30.34 KG/M2 | HEART RATE: 73 BPM | HEIGHT: 72 IN | SYSTOLIC BLOOD PRESSURE: 138 MMHG

## 2023-06-19 DIAGNOSIS — R74.8 ELEVATED LIVER ENZYMES: ICD-10-CM

## 2023-06-19 DIAGNOSIS — K76.0 FATTY LIVER: Primary | ICD-10-CM

## 2023-06-19 PROCEDURE — 99214 OFFICE O/P EST MOD 30 MIN: CPT | Performed by: NURSE PRACTITIONER

## 2023-06-19 NOTE — PATIENT INSTRUCTIONS
Recommend vitamin E 800 IU daily.      Fatty Liver Disease  The liver converts food into energy, removes toxic material from the blood, makes important proteins, and absorbs necessary vitamins from food. Fatty liver disease occurs when too much fat has built up in your liver cells. Fatty liver disease is also called hepatic steatosis.  In many cases, fatty liver disease does not cause symptoms or problems. It is often diagnosed when tests are being done for other reasons. However, over time, fatty liver can cause inflammation that may lead to more serious liver problems, such as scarring of the liver (cirrhosis) and liver failure.  Fatty liver is associated with insulin resistance, increased body fat, high blood pressure (hypertension), and high cholesterol. These are features of metabolic syndrome and increase your risk for stroke, diabetes, and heart disease.  What are the causes?  This condition may be caused by components of metabolic syndrome:  Obesity.  Insulin resistance.  High cholesterol.  Other causes:  Alcohol abuse.  Poor nutrition.  Cushing syndrome.  Pregnancy.  Certain drugs.  Poisons.  Some viral infections.  What increases the risk?  You are more likely to develop this condition if you:  Abuse alcohol.  Are overweight.  Have diabetes.  Have hepatitis.  Have a high triglyceride level.  Are pregnant.  What are the signs or symptoms?  Fatty liver disease often does not cause symptoms. If symptoms do develop, they can include:  Fatigue and weakness.  Weight loss.  Confusion.  Nausea, vomiting, or abdominal pain.  Yellowing of your skin and the white parts of your eyes (jaundice).  Itchy skin.  How is this diagnosed?  This condition may be diagnosed by:  A physical exam and your medical history.  Blood tests.  Imaging tests, such as an ultrasound, CT scan, or MRI.  A liver biopsy. A small sample of liver tissue is removed using a needle. The sample is then looked at under a microscope.  How is this  treated?  Fatty liver disease is often caused by other health conditions. Treatment for fatty liver may involve medicines and lifestyle changes to manage conditions such as:  Alcoholism.  High cholesterol.  Diabetes.  Being overweight or obese.  Follow these instructions at home:    Do not drink alcohol. If you have trouble quitting, ask your health care provider how to safely quit with the help of medicine or a supervised program. This is important to keep your condition from getting worse.  Eat a healthy diet as told by your health care provider. Ask your health care provider about working with a dietitian to develop an eating plan.  Exercise regularly. This can help you lose weight and control your cholesterol and diabetes. Talk to your health care provider about an exercise plan and which activities are best for you.  Take over-the-counter and prescription medicines only as told by your health care provider.  Keep all follow-up visits. This is important.  Contact a health care provider if:  You have trouble controlling your:  Blood sugar. This is especially important if you have diabetes.  Cholesterol.  Drinking of alcohol.  Get help right away if:  You have abdominal pain.  You have jaundice.  You have nausea and are vomiting.  You vomit blood or material that looks like coffee grounds.  You have stools that are black, tar-like, or bloody.  Summary  Fatty liver disease develops when too much fat builds up in the cells of your liver.  Fatty liver disease often causes no symptoms or problems. However, over time, fatty liver can cause inflammation that may lead to more serious liver problems, such as scarring of the liver (cirrhosis).  You are more likely to develop this condition if you abuse alcohol, are pregnant, are overweight, have diabetes, have hepatitis, or have high triglyceride or cholesterol levels.  Contact your health care provider if you have trouble controlling your blood sugar, cholesterol, or  drinking of alcohol.  This information is not intended to replace advice given to you by your health care provider. Make sure you discuss any questions you have with your health care provider.  Document Revised: 09/30/2021 Document Reviewed: 09/30/2021  Elsevier Patient Education © 2022 [employer]

## 2023-06-19 NOTE — PROGRESS NOTES
Chief Complaint     hepatic steatosis    History of Present Illness     Carlos Mcgregor is a 52 y.o. male who presents to [facility] on referral from Dania Minor, * for a gastroenterology evaluation of fatty liver.      He reports being told that he had elevated liver enzymes at age 16 and have been elevated since that time.  He was told that he had fatty liver.      Denies family history of liver disease.  Reports that he drinks ETOH only occasionally.  States that he was drinking on the weekends only.  He stopped drinking ETOH last year for several months.  He's lost 40 lbs since December.  He's been eating less and has cut out carbs and is doing intermittent fasting.      Denies abdominal pain.  Reports that he has some tenderness in RUQ with pressure on abdomen.         History      Past Medical History:   Diagnosis Date    Allergic rhinitis due to allergen 11/02/2020    Arthritis     Elevated glucose 11/02/2020    Elevated LFTs     Essential hypertension 11/02/2020    Foot pain, left 09/27/2018    Foreign body in soft tissue 09/27/2018    GERD (gastroesophageal reflux disease) 11/02/2020    Heel pain     HLD (hyperlipidemia)     Hypertension     Ingrowing toenail     Numbness in feet        Past Surgical History:   Procedure Laterality Date    COLONOSCOPY      COLONOSCOPY N/A 4/21/2022    Procedure: COLONOSCOPY WITH POLYPECTOMY;  Surgeon: Paolo Oliver MD;  Location: [facility] ENDOSCOPY;  Service: General;  Laterality: N/A;  COLON POLYP    ENDOSCOPY      US GUIDED FINE NEEDLE ASPIRATION  09/09/2019    US GUIDED FINE NEEDLE ASPIRATION  09/10/2019       Family History   Problem Relation Age of Onset    Diabetes Mother     Diabetes Maternal Grandfather     Hypertension Other     Heart disease Other     Malig Hyperthermia Neg Hx         Current Medications        Current Outpatient Medications:     clindamycin (CLEOCIN) 300 MG capsule, Take 1 capsule by mouth 4  "(Four) Times a Day., Disp: 28 capsule, Rfl: 0    hydroCHLOROthiazide (HYDRODIURIL) 25 MG tablet, TAKE 1 TABLET BY MOUTH DAILY, Disp: 90 tablet, Rfl: 0    metoprolol succinate XL (TOPROL-XL) 50 MG 24 hr tablet, Take 1 tablet by mouth 2 (Two) Times a Day. (Patient taking differently: Take 1 tablet by mouth Daily.), Disp: 180 tablet, Rfl: 1    mupirocin (BACTROBAN) 2 % ointment, Apply 1 application topically to the appropriate area as directed 3 (Three) Times a Day., Disp: 30 g, Rfl: 0    sildenafil (Viagra) 50 MG tablet, Take 1 tablet by mouth Daily As Needed for Erectile Dysfunction., Disp: 10 tablet, Rfl: 2    triamcinolone (KENALOG) 0.1 % ointment, Apply 1 application topically to the appropriate area as directed 2 (Two) Times a Day., Disp: 80 g, Rfl: 0     Allergies     Allergies   Allergen Reactions    Latex Rash       Social History       Social History     Social History Narrative    Not on file       Immunizations     Immunization:  Immunization History   Administered Date(s) Administered    COVID-19 (PFIZER) Purple Cap Monovalent 05/14/2021, 09/23/2021    FluLaval/Fluzone >6mos 12/12/2022    Influenza, Unspecified 10/02/2020, 12/12/2022    Pneumococcal Conjugate 20-Valent (PCV20) 01/27/2023    Tdap 09/25/2018          Objective     Objective     Vital Signs:   /88 (BP Location: Left arm, Patient Position: Sitting, Cuff Size: Adult)   Pulse 73   Ht 182.9 cm (72\")   Wt 102 kg (224 lb)   BMI 30.38 kg/m²       Physical Exam    Results      Result Review :   The following data was reviewed by: JORJE Ramos on 06/19/2023:    CBC w/diff          12/12/2022    10:58   CBC w/Diff   WBC 6.24    RBC 5.14    Hemoglobin 15.5    Hematocrit 44.4    MCV 86.4    MCH 30.2    MCHC 34.9    RDW 12.7    Platelets 224    Neutrophil Rel % 62.3    Immature Granulocyte Rel % 0.5    Lymphocyte Rel % 25.0    Monocyte Rel % 10.3    Eosinophil Rel % 1.6    Basophil Rel % 0.3      CMP          12/12/2022    10:58 "   CMP   Glucose 115    BUN 16    Creatinine 0.79    EGFR 105.6    Sodium 139    Potassium 4.1    Chloride 104    Calcium 9.1    Total Protein 7.0    Albumin 4.30    Globulin 2.7    Total Bilirubin 0.5    Alkaline Phosphatase 80    AST (SGOT) 32    ALT (SGPT) 86    Albumin/Globulin Ratio 1.6    BUN/Creatinine Ratio 20.3    Anion Gap 12.5      US Liver (01/13/2023 10:18) hepatic steatosis.  Normal gallbladder.      1/29/2021 Hepatitis B surface antigen-negative, hepatitis B core IgM-negative, hepatitis B total core antibody-negative, hepatitis B surface antibody-nonreactive, hepatitis C antibody-negative.             Assessment and Plan        Assessment and Plan    Diagnoses and all orders for this visit:    1. Fatty liver (Primary)  -     Liver Elastography; Future  -     Alpha - 1 - Antitrypsin  -     RASHAAD  -     Anti-Smooth Muscle Antibody Titer  -     Ceruloplasmin  -     Ferritin  -     Immunofixation, Serum  -     Iron Profile  -     Mitochondrial Antibodies, M2  -     CBC Auto Differential; Future  -     Comprehensive Metabolic Panel; Future    2. Elevated liver enzymes  -     Liver Elastography; Future  -     Alpha - 1 - Antitrypsin  -     RASHAAD  -     Anti-Smooth Muscle Antibody Titer  -     Ceruloplasmin  -     Ferritin  -     Immunofixation, Serum  -     Iron Profile  -     Mitochondrial Antibodies, M2  -     CBC Auto Differential; Future  -     Comprehensive Metabolic Panel; Future          Follow Up        Follow Up   Return in about 4 months (around 10/19/2023) for elevated liver enzymes, fatty liver.  Patient was given instructions and counseling regarding his condition or for health maintenance advice. Please see specific information pulled into the AVS if appropriate.

## 2023-07-14 LAB
ALPHA1 GLOB MFR UR ELPH: 127 MG/DL (ref 90–200)
CERULOPLASMIN SERPL-MCNC: 24 MG/DL (ref 16–31)
FERRITIN SERPL-MCNC: 488 NG/ML (ref 30–400)
IRON 24H UR-MRATE: 110 MCG/DL (ref 59–158)
IRON SATN MFR SERPL: 28 % (ref 20–50)
TIBC SERPL-MCNC: 393 MCG/DL (ref 298–536)
TRANSFERRIN SERPL-MCNC: 264 MG/DL (ref 200–360)

## 2023-07-26 DIAGNOSIS — R60.9 EDEMA, UNSPECIFIED TYPE: ICD-10-CM

## 2023-07-26 RX ORDER — HYDROCHLOROTHIAZIDE 25 MG/1
25 TABLET ORAL DAILY
Qty: 30 TABLET | Refills: 0 | Status: SHIPPED | OUTPATIENT
Start: 2023-07-26

## 2023-08-03 ENCOUNTER — TELEPHONE (OUTPATIENT)
Dept: GASTROENTEROLOGY | Facility: CLINIC | Age: 53
End: 2023-08-03
Payer: COMMERCIAL

## 2023-08-04 ENCOUNTER — PROCEDURE VISIT (OUTPATIENT)
Dept: OTHER | Facility: HOSPITAL | Age: 53
End: 2023-08-04
Payer: COMMERCIAL

## 2023-08-04 DIAGNOSIS — K76.0 FATTY LIVER: ICD-10-CM

## 2023-08-04 DIAGNOSIS — R74.8 ELEVATED LIVER ENZYMES: ICD-10-CM

## 2023-08-04 PROCEDURE — 91200 LIVER ELASTOGRAPHY: CPT | Performed by: NURSE PRACTITIONER

## 2023-08-07 ENCOUNTER — TELEPHONE (OUTPATIENT)
Dept: GASTROENTEROLOGY | Facility: CLINIC | Age: 53
End: 2023-08-07
Payer: COMMERCIAL

## 2023-08-07 NOTE — PROGRESS NOTES
Liver Elastography  Performed by: Kathie Rodriguez APRN  Authorized by: Kathie Rodriguez APRN   Ordering Provider: Kathie Rodriguez APRN    Probe:  XL+  Procedure Details:  Procedure: After providing an oral and written explanation of the Fibroscan vibration controlled transient elastography (VTCE) test procedure to the patient. The patient was placed in supine position with right arm in maximum abduction to allow optimal exposure of right lateral abdomen. Patient was briefly assessed, identifying terminus of the xyphoid process and locating an ideal transient elastography testing site, midline and lateral to this point. Patient was instructed to breathe normally and remain stationary during the test process. Pre-measurement data confirmed the transient elastography probe was centered over the liver parenchyma. A series of ten 50 Hz mechanical pulses were applied with controlled application pressure to induce a mechanical shear wave in the liver tissue. For each measurement, the shear wave propagation speed was detected, displayed and converted to its equivalent liver stiffness value in kilopascals. Skin to liver capsules distance and shear wave characteristics were monitored during the entire examination to assure quality data. Median liver stiffness measurement and interquartile range were calculated and displayed in real time. Acquired measurement data was stored and submitted to the provider for review and interpretation. Patient tolerated the procedure well and was discharged without incident.   Clinical Information:     NPO 3 Hours or More: Yes      Actively Drinking: No    Findings:     Median Liver Stiffness Score:  4.5    Interquartile Range (IQR) to Median Ratio:  26    Marce Stiffness Consistent with:  F0-F1    Current Scan Considered Reliab: Yes      Median Controlled Attenuation Parameter (dB/m):  307    IQR:  4    CAP SCORE:  Moderate/severe liver fat

## 2023-08-07 NOTE — TELEPHONE ENCOUNTER
----- Message from JORJE Ramos sent at 8/7/2023 11:17 AM EDT -----  FibroScan consistent with severe fatty liver and no fibrosis of the liver.

## 2023-08-09 ENCOUNTER — OFFICE VISIT (OUTPATIENT)
Dept: FAMILY MEDICINE CLINIC | Facility: CLINIC | Age: 53
End: 2023-08-09
Payer: COMMERCIAL

## 2023-08-09 VITALS
RESPIRATION RATE: 20 BRPM | HEART RATE: 73 BPM | SYSTOLIC BLOOD PRESSURE: 113 MMHG | OXYGEN SATURATION: 97 % | DIASTOLIC BLOOD PRESSURE: 82 MMHG | HEIGHT: 72 IN | BODY MASS INDEX: 30.56 KG/M2 | WEIGHT: 225.6 LBS

## 2023-08-09 DIAGNOSIS — R60.9 EDEMA, UNSPECIFIED TYPE: Chronic | ICD-10-CM

## 2023-08-09 DIAGNOSIS — I10 HYPERTENSION, UNSPECIFIED TYPE: Chronic | ICD-10-CM

## 2023-08-09 DIAGNOSIS — E78.5 HYPERLIPIDEMIA, UNSPECIFIED HYPERLIPIDEMIA TYPE: Chronic | ICD-10-CM

## 2023-08-09 DIAGNOSIS — R73.03 PREDIABETES: Primary | ICD-10-CM

## 2023-08-09 PROCEDURE — 99214 OFFICE O/P EST MOD 30 MIN: CPT | Performed by: PHYSICIAN ASSISTANT

## 2023-08-09 RX ORDER — LOSARTAN POTASSIUM 100 MG/1
1 TABLET ORAL DAILY
COMMUNITY
Start: 2023-07-28 | End: 2023-08-09 | Stop reason: SDUPTHER

## 2023-08-09 RX ORDER — LOSARTAN POTASSIUM 50 MG/1
50 TABLET ORAL DAILY
Qty: 90 TABLET | Refills: 1 | Status: SHIPPED | OUTPATIENT
Start: 2023-08-09

## 2023-08-09 RX ORDER — METOPROLOL SUCCINATE 50 MG/1
50 TABLET, EXTENDED RELEASE ORAL 2 TIMES DAILY
Qty: 180 TABLET | Refills: 1 | Status: SHIPPED | OUTPATIENT
Start: 2023-08-09

## 2023-08-09 RX ORDER — HYDROCHLOROTHIAZIDE 25 MG/1
25 TABLET ORAL DAILY
Qty: 90 TABLET | Refills: 1 | Status: SHIPPED | OUTPATIENT
Start: 2023-08-09

## 2023-08-09 NOTE — PROGRESS NOTES
Chief Complaint  Chief Complaint   Patient presents with    lab review       Subjective          Carlos Mcgregor presents to [facility] for lab review and 6 month follow up.     History of Present Illness    Pt presents in office today for lab review. Pt had labs drawn on 7/14/23.     HTN: Patient presents for hypertension management. Patient is currently taking HCTZ, Losartan, Metoprolol and is consistent with medication. Patient denies chest pain, shortness of air and edema. Patient was out of Losartan for some time and while bp increased, it was not out of control; per patient. Patient would like to decrease dose of Losartan since he has had weight loss.    Labs reviewed with patient: LDL and glucose elevated; patient is working on weight loss with high protein/keto like diet. Patient has had better LDL in past. Will continue to work on diet and increase exercise.    Colon: 4.21.22    Medical History: has a past medical history of Allergic rhinitis due to allergen (11/02/2020), Arthritis, Elevated glucose (11/02/2020), Elevated LFTs, Essential hypertension (11/02/2020), Foot pain, left (09/27/2018), Foreign body in soft tissue (09/27/2018), GERD (gastroesophageal reflux disease) (11/02/2020), Heel pain, HLD (hyperlipidemia), Hypertension, Ingrowing toenail, and Numbness in feet.   Surgical History: has a past surgical history that includes US Guided Fine Needle Aspiration (09/09/2019); US Guided Fine Needle Aspiration (09/10/2019); Colonoscopy; Esophagogastroduodenoscopy; and Colonoscopy (N/A, 4/21/2022).   Family History: family history includes Diabetes in his maternal grandfather and mother; Heart disease in an other family member; Hypertension in an other family member.   Social History: reports that he has never smoked. He has never used smokeless tobacco. He reports current alcohol use. He reports that he does not use drugs.    Allergies: Latex    Health Maintenance Due  "  Topic Date Due    ANNUAL PHYSICAL  Never done       Immunization History   Administered Date(s) Administered    COVID-19 (PFIZER) Purple Cap Monovalent 05/14/2021, 09/23/2021    Fluzone >6mos 12/12/2022    Influenza, Unspecified 10/02/2020, 12/12/2022    Pneumococcal Conjugate 20-Valent (PCV20) 01/27/2023    Tdap 09/25/2018       Objective     Vitals:    08/09/23 1243   BP: 113/82   BP Location: Left arm   Patient Position: Sitting   Cuff Size: Adult   Pulse: 73   Resp: 20   SpO2: 97%   Weight: 102 kg (225 lb 9.6 oz)   Height: 182.9 cm (72\")     Body mass index is 30.6 kg/mý.     Wt Readings from Last 3 Encounters:   08/09/23 102 kg (225 lb 9.6 oz)   06/19/23 102 kg (224 lb)   06/06/23 103 kg (226 lb 11.2 oz)     BP Readings from Last 3 Encounters:   08/09/23 113/82   06/19/23 138/88   06/06/23 127/84              Patient Care Team:  Dania Minor PA as PCP - General (Family Medicine)  Gege Dee PA-C as Consulting Physician (Dermatology)  Paolo Oliver MD as Consulting Physician (General Surgery)  Kathie Rodriguez APRN as Nurse Practitioner (Nurse Practitioner)    Physical Exam  Vitals and nursing note reviewed.   Constitutional:       Appearance: Normal appearance. He is obese.   HENT:      Head: Normocephalic and atraumatic.   Neck:      Vascular: No carotid bruit.      Comments: Thyroid : gland size normal, nontender, no nodules or masses present on palpation   Cardiovascular:      Rate and Rhythm: Normal rate and regular rhythm.      Pulses: Normal pulses.      Heart sounds: Normal heart sounds.   Pulmonary:      Effort: Pulmonary effort is normal.      Breath sounds: Normal breath sounds.   Musculoskeletal:      Cervical back: Neck supple. No tenderness.      Right lower leg: No edema.      Left lower leg: No edema.   Lymphadenopathy:      Cervical: No cervical adenopathy.   Neurological:      Mental Status: He is alert.   Psychiatric:         Mood and Affect: Mood normal.         " Behavior: Behavior normal.         Result Review :                      Lab on 07/14/2023   Component Date Value Ref Range Status    Total Cholesterol 07/14/2023 221 (H)  0 - 200 mg/dL Final    Triglycerides 07/14/2023 132  0 - 150 mg/dL Final    HDL Cholesterol 07/14/2023 28 (L)  40 - 60 mg/dL Final    LDL Cholesterol  07/14/2023 169 (H)  0 - 100 mg/dL Final    VLDL Cholesterol 07/14/2023 24  5 - 40 mg/dL Final    LDL/HDL Ratio 07/14/2023 5.95   Final    Hemoglobin A1C 07/14/2023 5.70 (H)  4.80 - 5.60 % Final    WBC 07/14/2023 6.32  3.40 - 10.80 10*3/mm3 Final    RBC 07/14/2023 5.57  4.14 - 5.80 10*6/mm3 Final    Hemoglobin 07/14/2023 15.9  13.0 - 17.7 g/dL Final    Hematocrit 07/14/2023 47.7  37.5 - 51.0 % Final    MCV 07/14/2023 85.6  79.0 - 97.0 fL Final    MCH 07/14/2023 28.5  26.6 - 33.0 pg Final    MCHC 07/14/2023 33.3  31.5 - 35.7 g/dL Final    RDW 07/14/2023 13.2  12.3 - 15.4 % Final    RDW-SD 07/14/2023 40.9  37.0 - 54.0 fl Final    MPV 07/14/2023 10.1  6.0 - 12.0 fL Final    Platelets 07/14/2023 238  140 - 450 10*3/mm3 Final    Neutrophil % 07/14/2023 62.8  42.7 - 76.0 % Final    Lymphocyte % 07/14/2023 26.3  19.6 - 45.3 % Final    Monocyte % 07/14/2023 8.5  5.0 - 12.0 % Final    Eosinophil % 07/14/2023 1.3  0.3 - 6.2 % Final    Basophil % 07/14/2023 0.6  0.0 - 1.5 % Final    Immature Grans % 07/14/2023 0.5  0.0 - 0.5 % Final    Neutrophils, Absolute 07/14/2023 3.97  1.70 - 7.00 10*3/mm3 Final    Lymphocytes, Absolute 07/14/2023 1.66  0.70 - 3.10 10*3/mm3 Final    Monocytes, Absolute 07/14/2023 0.54  0.10 - 0.90 10*3/mm3 Final    Eosinophils, Absolute 07/14/2023 0.08  0.00 - 0.40 10*3/mm3 Final    Basophils, Absolute 07/14/2023 0.04  0.00 - 0.20 10*3/mm3 Final    Immature Grans, Absolute 07/14/2023 0.03  0.00 - 0.05 10*3/mm3 Final    nRBC 07/14/2023 0.0  0.0 - 0.2 /100 WBC Final    Glucose 07/14/2023 101 (H)  65 - 99 mg/dL Final    BUN 07/14/2023 19  6 - 20 mg/dL Final    Creatinine 07/14/2023 0.96  0.76  - 1.27 mg/dL Final    Sodium 07/14/2023 139  136 - 145 mmol/L Final    Potassium 07/14/2023 3.6  3.5 - 5.2 mmol/L Final    Chloride 07/14/2023 101  98 - 107 mmol/L Final    CO2 07/14/2023 24.8  22.0 - 29.0 mmol/L Final    Calcium 07/14/2023 9.6  8.6 - 10.5 mg/dL Final    Total Protein 07/14/2023 7.2  6.0 - 8.5 g/dL Final    Albumin 07/14/2023 4.7  3.5 - 5.2 g/dL Final    ALT (SGPT) 07/14/2023 50 (H)  1 - 41 U/L Final    AST (SGOT) 07/14/2023 25  1 - 40 U/L Final    Alkaline Phosphatase 07/14/2023 63  39 - 117 U/L Final    Total Bilirubin 07/14/2023 0.6  0.0 - 1.2 mg/dL Final    Globulin 07/14/2023 2.5  gm/dL Final    A/G Ratio 07/14/2023 1.9  g/dL Final    BUN/Creatinine Ratio 07/14/2023 19.8  7.0 - 25.0 Final    Anion Gap 07/14/2023 13.2  5.0 - 15.0 mmol/L Final    eGFR 07/14/2023 93.9  >60.0 mL/min/1.73 Final   Office Visit on 06/19/2023   Component Date Value Ref Range Status    ALPHA -1 ANTITRYPSIN 07/14/2023 127  90 - 200 mg/dL Final    RASHAAD Direct 07/14/2023 Negative  Negative Final    Smooth Muscle Ab 07/14/2023 6  0 - 19 Units Final                     Negative                     0 - 19                   Weak positive               20 - 30                   Moderate to strong positive     >30   Actin Antibodies are found in 52-85% of patients with   autoimmune hepatitis or chronic active hepatitis and   in 22% of patients with primary biliary cirrhosis.    Ceruloplasmin 07/14/2023 24  16 - 31 mg/dL Final    Ferritin 07/14/2023 488.00 (H)  30.00 - 400.00 ng/mL Final    Immunofixation Result, Serum 07/14/2023 Comment   Final    No monoclonality detected.    IgG 07/14/2023 851  603 - 1613 mg/dL Final    IgA 07/14/2023 316  90 - 386 mg/dL Final    IgM 07/14/2023 50  20 - 172 mg/dL Final    Iron 07/14/2023 110  59 - 158 mcg/dL Final    Iron Saturation (TSAT) 07/14/2023 28  20 - 50 % Final    Transferrin 07/14/2023 264  200 - 360 mg/dL Final    TIBC 07/14/2023 393  298 - 536 mcg/dL Final    Mitochondrial Ab  07/14/2023 <20.0  0.0 - 20.0 Units Final                                    Negative    0.0 - 20.0                                  Equivocal  20.1 - 24.9                                  Positive         >24.9  Mitochondrial (M2) Antibodies are found in 90-96% of  patients with primary biliary cirrhosis.          Assessment and Plan      Diagnoses and all orders for this visit:    1. Prediabetes (Primary)  Comments:  Monitor diet and increase activity.    2. Edema, unspecified type  Comments:  Improved with HCTZ 25 Mg once daily; follow-up in 6 months  Orders:  -     hydroCHLOROthiazide (HYDRODIURIL) 25 MG tablet; Take 1 tablet by mouth Daily.  Dispense: 90 tablet; Refill: 1    3. Hypertension, unspecified type  Comments:  Stable; decrease losartan to 50mg: continue with Toprol XL 50mg twice daily and HCTZ 25 Mg daily, monitor bp and f/u in 6mths/sooner if elevated.  Orders:  -     losartan (COZAAR) 50 MG tablet; Take 1 tablet by mouth Daily.  Dispense: 90 tablet; Refill: 1  -     metoprolol succinate XL (TOPROL-XL) 50 MG 24 hr tablet; Take 1 tablet by mouth 2 (Two) Times a Day.  Dispense: 180 tablet; Refill: 1    4. Hyperlipidemia, unspecified hyperlipidemia type  Comments:  No medications at present; discussed diet and activity; recheck at f/u ov.            Follow Up     Return in about 6 months (around 2/9/2024).    Patient was given instructions and counseling regarding his condition or for health maintenance advice. Please see specific information pulled into the AVS if appropriate.

## 2023-08-25 DIAGNOSIS — R60.9 EDEMA, UNSPECIFIED TYPE: Chronic | ICD-10-CM

## 2023-08-25 RX ORDER — HYDROCHLOROTHIAZIDE 25 MG/1
25 TABLET ORAL DAILY
Qty: 30 TABLET | OUTPATIENT
Start: 2023-08-25

## 2023-11-20 ENCOUNTER — OFFICE VISIT (OUTPATIENT)
Dept: GASTROENTEROLOGY | Facility: CLINIC | Age: 53
End: 2023-11-20
Payer: COMMERCIAL

## 2023-11-20 VITALS
DIASTOLIC BLOOD PRESSURE: 96 MMHG | HEART RATE: 76 BPM | SYSTOLIC BLOOD PRESSURE: 143 MMHG | HEIGHT: 72 IN | WEIGHT: 241 LBS | BODY MASS INDEX: 32.64 KG/M2

## 2023-11-20 DIAGNOSIS — R74.8 ELEVATED LIVER ENZYMES: ICD-10-CM

## 2023-11-20 DIAGNOSIS — K76.0 FATTY LIVER: Primary | ICD-10-CM

## 2023-11-20 NOTE — PATIENT INSTRUCTIONS
Recommend vitamin E 800 IU daily.        Fatty Liver Disease    The liver converts food into energy, removes toxic material from the blood, makes important proteins, and absorbs necessary vitamins from food. Fatty liver disease occurs when too much fat has built up in your liver cells. Fatty liver disease is also called hepatic steatosis.  In many cases, fatty liver disease does not cause symptoms or problems. It is often diagnosed when tests are being done for other reasons. However, over time, fatty liver can cause inflammation that may lead to more serious liver problems, such as scarring of the liver (cirrhosis) and liver failure.  Fatty liver is associated with insulin resistance, increased body fat, high blood pressure (hypertension), and high cholesterol. These are features of metabolic syndrome and increase your risk for stroke, diabetes, and heart disease.  What are the causes?  This condition may be caused by components of metabolic syndrome:  Obesity.  Insulin resistance.  High cholesterol.  Other causes:  Alcohol abuse.  Poor nutrition.  Cushing syndrome.  Pregnancy.  Certain drugs.  Poisons.  Some viral infections.  What increases the risk?  You are more likely to develop this condition if you:  Abuse alcohol.  Are overweight.  Have diabetes.  Have hepatitis.  Have a high triglyceride level.  Are pregnant.  What are the signs or symptoms?  Fatty liver disease often does not cause symptoms. If symptoms do develop, they can include:  Fatigue and weakness.  Weight loss.  Confusion.  Nausea, vomiting, or abdominal pain.  Yellowing of your skin and the white parts of your eyes (jaundice).  Itchy skin.  How is this diagnosed?  This condition may be diagnosed by:  A physical exam and your medical history.  Blood tests.  Imaging tests, such as an ultrasound, CT scan, or MRI.  A liver biopsy. A small sample of liver tissue is removed using a needle. The sample is then looked at under a microscope.  How is this  treated?  Fatty liver disease is often caused by other health conditions. Treatment for fatty liver may involve medicines and lifestyle changes to manage conditions such as:  Alcoholism.  High cholesterol.  Diabetes.  Being overweight or obese.  Follow these instructions at home:    Do not drink alcohol. If you have trouble quitting, ask your health care provider how to safely quit with the help of medicine or a supervised program. This is important to keep your condition from getting worse.  Eat a healthy diet as told by your health care provider. Ask your health care provider about working with a dietitian to develop an eating plan.  Exercise regularly. This can help you lose weight and control your cholesterol and diabetes. Talk to your health care provider about an exercise plan and which activities are best for you.  Take over-the-counter and prescription medicines only as told by your health care provider.  Keep all follow-up visits. This is important.  Contact a health care provider if:  You have trouble controlling your:  Blood sugar. This is especially important if you have diabetes.  Cholesterol.  Drinking of alcohol.  Get help right away if:  You have abdominal pain.  You have jaundice.  You have nausea and are vomiting.  You vomit blood or material that looks like coffee grounds.  You have stools that are black, tar-like, or bloody.  Summary  Fatty liver disease develops when too much fat builds up in the cells of your liver.  Fatty liver disease often causes no symptoms or problems. However, over time, fatty liver can cause inflammation that may lead to more serious liver problems, such as scarring of the liver (cirrhosis).  You are more likely to develop this condition if you abuse alcohol, are pregnant, are overweight, have diabetes, have hepatitis, or have high triglyceride or cholesterol levels.  Contact your health care provider if you have trouble controlling your blood sugar, cholesterol, or  drinking of alcohol.  This information is not intended to replace advice given to you by your health care provider. Make sure you discuss any questions you have with your health care provider.  Document Revised: 09/30/2021 Document Reviewed: 09/30/2021  Elsevier Patient Education © 2023 [employer]

## 2023-11-20 NOTE — PROGRESS NOTES
Chief Complaint     fatty liver and Elevated Hepatic Enzymes    History of Present Illness     Carlos Mcgregor is a 53 y.o. male who presents to [facility] for follow-up of fatty liver and elevated liver enzymes.      He reports that he's doing well.  He had lost some weight but over the past few months has gained weight back.  Denies abdominal pain.         History      Past Medical History:   Diagnosis Date    Allergic rhinitis due to allergen 11/02/2020    Arthritis     Elevated glucose 11/02/2020    Elevated LFTs     Essential hypertension 11/02/2020    Fatty liver     Foot pain, left 09/27/2018    Foreign body in soft tissue 09/27/2018    GERD (gastroesophageal reflux disease) 11/02/2020    Heel pain     HLD (hyperlipidemia)     Hypertension     Ingrowing toenail     Numbness in feet      Past Surgical History:   Procedure Laterality Date    COLONOSCOPY      COLONOSCOPY N/A 4/21/2022    Procedure: COLONOSCOPY WITH POLYPECTOMY;  Surgeon: Paolo Oliver MD;  Location: [facility];  Service: General;  Laterality: N/A;  COLON POLYP    ENDOSCOPY      US GUIDED FINE NEEDLE ASPIRATION  09/09/2019    US GUIDED FINE NEEDLE ASPIRATION  09/10/2019     Family History   Problem Relation Age of Onset    Diabetes Mother     Diabetes Maternal Grandfather     Hypertension Other     Heart disease Other     Malig Hyperthermia Neg Hx         Current Medications       Current Outpatient Medications:     losartan (COZAAR) 50 MG tablet, Take 1 tablet by mouth Daily., Disp: 90 tablet, Rfl: 1    metoprolol succinate XL (TOPROL-XL) 50 MG 24 hr tablet, Take 1 tablet by mouth 2 (Two) Times a Day., Disp: 180 tablet, Rfl: 1     Allergies     Allergies   Allergen Reactions    Latex Rash       Social History       Social History     Social History Narrative    Not on file         Objective       /96 (BP Location: Left arm, Patient Position: Sitting, Cuff Size: Adult)   Pulse 76   Ht  "182.9 cm (72\")   Wt 109 kg (241 lb)   BMI 32.69 kg/m²       Physical Exam    Results       Result Review :    The following data was reviewed by: JORJE Ramos on 11/20/2023:    CBC w/diff          12/12/2022    10:58 7/14/2023    12:19   CBC w/Diff   WBC 6.24  6.32    RBC 5.14  5.57    Hemoglobin 15.5  15.9    Hematocrit 44.4  47.7    MCV 86.4  85.6    MCH 30.2  28.5    MCHC 34.9  33.3    RDW 12.7  13.2    Platelets 224  238    Neutrophil Rel % 62.3  62.8    Immature Granulocyte Rel % 0.5  0.5    Lymphocyte Rel % 25.0  26.3    Monocyte Rel % 10.3  8.5    Eosinophil Rel % 1.6  1.3    Basophil Rel % 0.3  0.6      CMP          12/12/2022    10:58 7/14/2023    12:19   CMP   Glucose 115  101    BUN 16  19    Creatinine 0.79  0.96    EGFR 105.6  93.9    Sodium 139  139    Potassium 4.1  3.6    Chloride 104  101    Calcium 9.1  9.6    Total Protein 7.0  7.2    Albumin 4.30  4.7    Globulin 2.7  2.5    Total Bilirubin 0.5  0.6    Alkaline Phosphatase 80  63    AST (SGOT) 32  25    ALT (SGPT) 86  50    Albumin/Globulin Ratio 1.6  1.9    BUN/Creatinine Ratio 20.3  19.8    Anion Gap 12.5  13.2        Liver Workup   ALPHA -1 ANTITRYPSIN   Date Value Ref Range Status   07/14/2023 127 90 - 200 mg/dL Final     ds DNA Antibody   Date Value Ref Range Status   01/29/2021 NEGATIVE [IU]/mL Final     Smooth Muscle Ab   Date Value Ref Range Status   07/14/2023 6 0 - 19 Units Final     Comment:                      Negative                     0 - 19                   Weak positive               20 - 30                   Moderate to strong positive     >30   Actin Antibodies are found in 52-85% of patients with   autoimmune hepatitis or chronic active hepatitis and   in 22% of patients with primary biliary cirrhosis.     Ceruloplasmin   Date Value Ref Range Status   07/14/2023 24 16 - 31 mg/dL Final     Ferritin   Date Value Ref Range Status   07/14/2023 488.00 (H) 30.00 - 400.00 ng/mL Final     Immunofixation Result, Serum "   Date Value Ref Range Status   07/14/2023 Comment  Final     Comment:     No monoclonality detected.     IgG   Date Value Ref Range Status   07/14/2023 851 603 - 1613 mg/dL Final     IgA   Date Value Ref Range Status   07/14/2023 316 90 - 386 mg/dL Final     IgM   Date Value Ref Range Status   07/14/2023 50 20 - 172 mg/dL Final     Iron   Date Value Ref Range Status   07/14/2023 110 59 - 158 mcg/dL Final     TIBC   Date Value Ref Range Status   07/14/2023 393 298 - 536 mcg/dL Final     Iron Saturation (TSAT)   Date Value Ref Range Status   07/14/2023 28 20 - 50 % Final     Transferrin   Date Value Ref Range Status   07/14/2023 264 200 - 360 mg/dL Final     Mitochondrial Ab   Date Value Ref Range Status   07/14/2023 <20.0 0.0 - 20.0 Units Final     Comment:                                     Negative    0.0 - 20.0                                  Equivocal  20.1 - 24.9                                  Positive         >24.9  Mitochondrial (M2) Antibodies are found in 90-96% of  patients with primary biliary cirrhosis.     Protime   Date Value Ref Range Status   09/04/2019 10.8 9.4 - 12.0 s Final     INR   Date Value Ref Range Status   09/04/2019 1.05 (L) 2.00 - 3.00 NA Final     Comment:     Suggested Therapeutic Ranges For Oral Anticoagulant Therapy:  Level of Therapy                      INR Target Range  Standard Dose                            2.0-3.0  High Dose                                2.5-3.5  Patients not receiving anticoagulant  Therapy Normal Range                     0.6-1.2       8/4/2023 Fibroscan - S3, F0-F1.         Assessment and Plan              Diagnoses and all orders for this visit:    1. Fatty liver (Primary)    2. Elevated liver enzymes      Discussed with patient importance of weight loss and low carb diet reviewed with the patient. Written information was given to pt today. Explained to patient risks of fatty liver and the possibility of progression towards cirrhosis. Would recommend  pt f/u with PCP for monitoring, and if there are any signs of worsening liver function or development of cirrhosis, please return the pt to our office.         Follow Up     Follow Up   Return if symptoms worsen or fail to improve.  Patient was given instructions and counseling regarding his condition or for health maintenance advice. Please see specific information pulled into the AVS if appropriate.

## 2024-02-19 DIAGNOSIS — I10 HYPERTENSION, UNSPECIFIED TYPE: Chronic | ICD-10-CM

## 2024-02-19 RX ORDER — LOSARTAN POTASSIUM 50 MG/1
50 TABLET ORAL DAILY
Qty: 30 TABLET | Refills: 0 | Status: SHIPPED | OUTPATIENT
Start: 2024-02-19 | End: 2024-02-26 | Stop reason: SDUPTHER

## 2024-02-26 ENCOUNTER — LAB (OUTPATIENT)
Dept: LAB | Facility: HOSPITAL | Age: 54
End: 2024-02-26
Payer: COMMERCIAL

## 2024-02-26 ENCOUNTER — OFFICE VISIT (OUTPATIENT)
Dept: FAMILY MEDICINE CLINIC | Facility: CLINIC | Age: 54
End: 2024-02-26
Payer: COMMERCIAL

## 2024-02-26 VITALS
SYSTOLIC BLOOD PRESSURE: 122 MMHG | WEIGHT: 253.8 LBS | HEIGHT: 72 IN | HEART RATE: 98 BPM | RESPIRATION RATE: 20 BRPM | BODY MASS INDEX: 34.38 KG/M2 | OXYGEN SATURATION: 96 % | DIASTOLIC BLOOD PRESSURE: 88 MMHG

## 2024-02-26 DIAGNOSIS — I10 HYPERTENSION, UNSPECIFIED TYPE: ICD-10-CM

## 2024-02-26 DIAGNOSIS — R53.83 OTHER FATIGUE: ICD-10-CM

## 2024-02-26 DIAGNOSIS — E66.09 CLASS 1 OBESITY DUE TO EXCESS CALORIES WITH SERIOUS COMORBIDITY AND BODY MASS INDEX (BMI) OF 34.0 TO 34.9 IN ADULT: ICD-10-CM

## 2024-02-26 DIAGNOSIS — Z12.5 SCREENING FOR PROSTATE CANCER: ICD-10-CM

## 2024-02-26 DIAGNOSIS — R73.03 PREDIABETES: Primary | ICD-10-CM

## 2024-02-26 DIAGNOSIS — I10 HYPERTENSION, UNSPECIFIED TYPE: Chronic | ICD-10-CM

## 2024-02-26 DIAGNOSIS — G47.09 OTHER INSOMNIA: Chronic | ICD-10-CM

## 2024-02-26 LAB
25(OH)D3 SERPL-MCNC: 41 NG/ML (ref 30–100)
ALBUMIN SERPL-MCNC: 4.6 G/DL (ref 3.5–5.2)
ALBUMIN/GLOB SERPL: 2.1 G/DL
ALP SERPL-CCNC: 70 U/L (ref 39–117)
ALT SERPL W P-5'-P-CCNC: 111 U/L (ref 1–41)
ANION GAP SERPL CALCULATED.3IONS-SCNC: 12 MMOL/L (ref 5–15)
AST SERPL-CCNC: 34 U/L (ref 1–40)
BASOPHILS # BLD AUTO: 0.03 10*3/MM3 (ref 0–0.2)
BASOPHILS NFR BLD AUTO: 0.4 % (ref 0–1.5)
BILIRUB SERPL-MCNC: 0.4 MG/DL (ref 0–1.2)
BUN SERPL-MCNC: 15 MG/DL (ref 6–20)
BUN/CREAT SERPL: 18.3 (ref 7–25)
CALCIUM SPEC-SCNC: 9 MG/DL (ref 8.6–10.5)
CHLORIDE SERPL-SCNC: 103 MMOL/L (ref 98–107)
CHOLEST SERPL-MCNC: 199 MG/DL (ref 0–200)
CO2 SERPL-SCNC: 21 MMOL/L (ref 22–29)
CREAT SERPL-MCNC: 0.82 MG/DL (ref 0.76–1.27)
DEPRECATED RDW RBC AUTO: 41.7 FL (ref 37–54)
EGFRCR SERPLBLD CKD-EPI 2021: 103.7 ML/MIN/1.73
EOSINOPHIL # BLD AUTO: 0.12 10*3/MM3 (ref 0–0.4)
EOSINOPHIL NFR BLD AUTO: 1.8 % (ref 0.3–6.2)
ERYTHROCYTE [DISTWIDTH] IN BLOOD BY AUTOMATED COUNT: 13.1 % (ref 12.3–15.4)
GLOBULIN UR ELPH-MCNC: 2.2 GM/DL
GLUCOSE SERPL-MCNC: 104 MG/DL (ref 65–99)
HCT VFR BLD AUTO: 45.7 % (ref 37.5–51)
HDLC SERPL-MCNC: 30 MG/DL (ref 40–60)
HGB BLD-MCNC: 15.5 G/DL (ref 13–17.7)
IMM GRANULOCYTES # BLD AUTO: 0.05 10*3/MM3 (ref 0–0.05)
IMM GRANULOCYTES NFR BLD AUTO: 0.7 % (ref 0–0.5)
LDLC SERPL CALC-MCNC: 135 MG/DL (ref 0–100)
LDLC/HDLC SERPL: 4.37 {RATIO}
LYMPHOCYTES # BLD AUTO: 1.71 10*3/MM3 (ref 0.7–3.1)
LYMPHOCYTES NFR BLD AUTO: 25.4 % (ref 19.6–45.3)
MCH RBC QN AUTO: 29.5 PG (ref 26.6–33)
MCHC RBC AUTO-ENTMCNC: 33.9 G/DL (ref 31.5–35.7)
MCV RBC AUTO: 87 FL (ref 79–97)
MONOCYTES # BLD AUTO: 0.47 10*3/MM3 (ref 0.1–0.9)
MONOCYTES NFR BLD AUTO: 7 % (ref 5–12)
NEUTROPHILS NFR BLD AUTO: 4.36 10*3/MM3 (ref 1.7–7)
NEUTROPHILS NFR BLD AUTO: 64.7 % (ref 42.7–76)
NRBC BLD AUTO-RTO: 0 /100 WBC (ref 0–0.2)
PLATELET # BLD AUTO: 242 10*3/MM3 (ref 140–450)
PMV BLD AUTO: 10.3 FL (ref 6–12)
POTASSIUM SERPL-SCNC: 3.9 MMOL/L (ref 3.5–5.2)
PROT SERPL-MCNC: 6.8 G/DL (ref 6–8.5)
PSA SERPL-MCNC: 1.13 NG/ML (ref 0–4)
RBC # BLD AUTO: 5.25 10*6/MM3 (ref 4.14–5.8)
SODIUM SERPL-SCNC: 136 MMOL/L (ref 136–145)
TRIGL SERPL-MCNC: 189 MG/DL (ref 0–150)
TSH SERPL DL<=0.05 MIU/L-ACNC: 0.45 UIU/ML (ref 0.27–4.2)
VLDLC SERPL-MCNC: 34 MG/DL (ref 5–40)
WBC NRBC COR # BLD AUTO: 6.74 10*3/MM3 (ref 3.4–10.8)

## 2024-02-26 PROCEDURE — G0103 PSA SCREENING: HCPCS

## 2024-02-26 PROCEDURE — 82306 VITAMIN D 25 HYDROXY: CPT

## 2024-02-26 PROCEDURE — 36415 COLL VENOUS BLD VENIPUNCTURE: CPT

## 2024-02-26 PROCEDURE — 80061 LIPID PANEL: CPT

## 2024-02-26 PROCEDURE — 80050 GENERAL HEALTH PANEL: CPT

## 2024-02-26 RX ORDER — METOPROLOL SUCCINATE 50 MG/1
50 TABLET, EXTENDED RELEASE ORAL 2 TIMES DAILY
Qty: 180 TABLET | Refills: 1 | Status: SHIPPED | OUTPATIENT
Start: 2024-02-26

## 2024-02-26 RX ORDER — LOSARTAN POTASSIUM 50 MG/1
50 TABLET ORAL DAILY
Qty: 90 TABLET | Refills: 1 | Status: SHIPPED | OUTPATIENT
Start: 2024-02-26

## 2024-02-26 RX ORDER — ASPIRIN 81 MG/1
81 TABLET, CHEWABLE ORAL DAILY
COMMUNITY

## 2024-02-26 RX ORDER — TRAZODONE HYDROCHLORIDE 50 MG/1
50 TABLET ORAL NIGHTLY PRN
Qty: 90 TABLET | Refills: 1 | Status: SHIPPED | OUTPATIENT
Start: 2024-02-26

## 2024-02-26 RX ORDER — HYDROCHLOROTHIAZIDE 25 MG/1
25 TABLET ORAL DAILY
Qty: 90 TABLET | Refills: 1 | Status: SHIPPED | OUTPATIENT
Start: 2024-02-26

## 2024-02-26 NOTE — PROGRESS NOTES
Chief Complaint  Chief Complaint   Patient presents with    Follow-up     6 month     Hypertension       Subjective          Carlos Mcgregor presents to [facility] for 6mth follow up.    History of Present Illness    HTN: Patient presents for hypertension management. Patient is currently taking HCTZ, Losartan, Metoprolol and is not consistent with medication. Patient denies chest pain, shortness of air and edema. Patient is currently out of Toprol XL and is not taking HCTZ. Patient states since he has been out, he increased his Losartan to 100mg daily.     At last visit labs reviewed with patient: LDL and glucose elevated; patient was working on weight loss with high protein/keto like diet. Patient has had a 12lb weight gain since last ov.    Patient c/o difficulty sleeping.  Patient states at times he has a 36-hour workday.  He can fall asleep however wakes at 3 and starts to work.  Patient states when he has trials goes long periods of time without sleeping.  Patient has tried OTC ZzzQuil and NyQuil; patient states NyQuil helps minimally but ZzzQuil does not.  Patient takes magnesium daily as a supplement.  Patient questions if melatonin would work.  Patient complains of fatigue and tiredness.  In regards to snoring he thinks he does snore but states that he had a sleep study in the past and does not have sleep apnea and has not willing to be tested again.    GI on 11/2023 for fatty liver discussed diet and continued monitoring of LFTs.     Colon: 4.21.22  Last labs 7/14/23    Medical History: has a past medical history of Allergic rhinitis due to allergen (11/02/2020), Arthritis, Elevated glucose (11/02/2020), Elevated LFTs, Essential hypertension (11/02/2020), Fatty liver, Foot pain, left (09/27/2018), Foreign body in soft tissue (09/27/2018), GERD (gastroesophageal reflux disease) (11/02/2020), Heel pain, HLD (hyperlipidemia), Hypertension, Ingrowing toenail, and Numbness in  "feet.   Surgical History: has a past surgical history that includes US Guided Fine Needle Aspiration (09/09/2019); US Guided Fine Needle Aspiration (09/10/2019); Colonoscopy; Esophagogastroduodenoscopy; and Colonoscopy (N/A, 4/21/2022).   Family History: family history includes Diabetes in his maternal grandfather and mother; Heart disease in an other family member; Hypertension in an other family member.   Social History: reports that he has never smoked. He has never used smokeless tobacco. He reports current alcohol use. He reports that he does not use drugs.    Allergies: Latex    Health Maintenance Due   Topic Date Due    ANNUAL PHYSICAL  Never done       Immunization History   Administered Date(s) Administered    COVID-19 (PFIZER) Purple Cap Monovalent 05/14/2021, 09/23/2021    Fluzone (or Fluarix & Flulaval for VFC) >6mos 12/12/2022    Influenza, Unspecified 10/02/2020, 12/12/2022    Pneumococcal Conjugate 20-Valent (PCV20) 01/27/2023    Tdap 09/25/2018       Objective     Vitals:    02/26/24 1017 02/26/24 1055   BP: 128/96 122/88   Pulse: 98    Resp: 20    SpO2: 96%    Weight: 115 kg (253 lb 12.8 oz)    Height: 182.9 cm (72\")      Body mass index is 34.42 kg/m².     Wt Readings from Last 3 Encounters:   02/26/24 115 kg (253 lb 12.8 oz)   12/08/23 109 kg (241 lb)   11/20/23 109 kg (241 lb)     BP Readings from Last 3 Encounters:   02/26/24 122/88   12/08/23 125/79   11/20/23 143/96       BMI is >= 30 and <35. (Class 1 Obesity). The following options were offered after discussion;: weight loss educational material (shared in after visit summary)      Patient Care Team:  Dania Minor PA as PCP - General (Family Medicine)  Gege Dee PA-C as Consulting Physician (Dermatology)  Paolo Oliver MD as Consulting Physician (General Surgery)  Kathie Rodriguez APRN as Nurse Practitioner (Nurse Practitioner)    Physical Exam  Vitals and nursing note reviewed.   Constitutional:       " Appearance: Normal appearance. He is obese.   HENT:      Head: Normocephalic and atraumatic.   Neck:      Vascular: No carotid bruit.      Comments: Thyroid : gland size normal, nontender, no nodules or masses present on palpation   Cardiovascular:      Rate and Rhythm: Normal rate and regular rhythm.      Pulses: Normal pulses.      Heart sounds: Normal heart sounds.   Pulmonary:      Effort: Pulmonary effort is normal.      Breath sounds: Normal breath sounds.   Musculoskeletal:      Cervical back: Neck supple. No tenderness.      Right lower leg: Edema present.      Left lower leg: Edema present.      Comments: Trace lower extremity edema.   Lymphadenopathy:      Cervical: No cervical adenopathy.   Neurological:      Mental Status: He is alert.   Psychiatric:         Mood and Affect: Mood normal.         Behavior: Behavior normal.           Result Review :   Lab on 02/26/2024   Component Date Value Ref Range Status    PSA 02/26/2024 1.130  0.000 - 4.000 ng/mL Final    Glucose 02/26/2024 104 (H)  65 - 99 mg/dL Final    BUN 02/26/2024 15  6 - 20 mg/dL Final    Creatinine 02/26/2024 0.82  0.76 - 1.27 mg/dL Final    Sodium 02/26/2024 136  136 - 145 mmol/L Final    Potassium 02/26/2024 3.9  3.5 - 5.2 mmol/L Final    Chloride 02/26/2024 103  98 - 107 mmol/L Final    CO2 02/26/2024 21.0 (L)  22.0 - 29.0 mmol/L Final    Calcium 02/26/2024 9.0  8.6 - 10.5 mg/dL Final    Total Protein 02/26/2024 6.8  6.0 - 8.5 g/dL Final    Albumin 02/26/2024 4.6  3.5 - 5.2 g/dL Final    ALT (SGPT) 02/26/2024 111 (H)  1 - 41 U/L Final    AST (SGOT) 02/26/2024 34  1 - 40 U/L Final    Alkaline Phosphatase 02/26/2024 70  39 - 117 U/L Final    Total Bilirubin 02/26/2024 0.4  0.0 - 1.2 mg/dL Final    Globulin 02/26/2024 2.2  gm/dL Final    A/G Ratio 02/26/2024 2.1  g/dL Final    BUN/Creatinine Ratio 02/26/2024 18.3  7.0 - 25.0 Final    Anion Gap 02/26/2024 12.0  5.0 - 15.0 mmol/L Final    eGFR 02/26/2024 103.7  >60.0 mL/min/1.73 Final    Total  Cholesterol 02/26/2024 199  0 - 200 mg/dL Final    Triglycerides 02/26/2024 189 (H)  0 - 150 mg/dL Final    HDL Cholesterol 02/26/2024 30 (L)  40 - 60 mg/dL Final    LDL Cholesterol  02/26/2024 135 (H)  0 - 100 mg/dL Final    VLDL Cholesterol 02/26/2024 34  5 - 40 mg/dL Final    LDL/HDL Ratio 02/26/2024 4.37   Final    TSH 02/26/2024 0.448  0.270 - 4.200 uIU/mL Final    25 Hydroxy, Vitamin D 02/26/2024 41.0  30.0 - 100.0 ng/ml Final    WBC 02/26/2024 6.74  3.40 - 10.80 10*3/mm3 Final    RBC 02/26/2024 5.25  4.14 - 5.80 10*6/mm3 Final    Hemoglobin 02/26/2024 15.5  13.0 - 17.7 g/dL Final    Hematocrit 02/26/2024 45.7  37.5 - 51.0 % Final    MCV 02/26/2024 87.0  79.0 - 97.0 fL Final    MCH 02/26/2024 29.5  26.6 - 33.0 pg Final    MCHC 02/26/2024 33.9  31.5 - 35.7 g/dL Final    RDW 02/26/2024 13.1  12.3 - 15.4 % Final    RDW-SD 02/26/2024 41.7  37.0 - 54.0 fl Final    MPV 02/26/2024 10.3  6.0 - 12.0 fL Final    Platelets 02/26/2024 242  140 - 450 10*3/mm3 Final    Neutrophil % 02/26/2024 64.7  42.7 - 76.0 % Final    Lymphocyte % 02/26/2024 25.4  19.6 - 45.3 % Final    Monocyte % 02/26/2024 7.0  5.0 - 12.0 % Final    Eosinophil % 02/26/2024 1.8  0.3 - 6.2 % Final    Basophil % 02/26/2024 0.4  0.0 - 1.5 % Final    Immature Grans % 02/26/2024 0.7 (H)  0.0 - 0.5 % Final    Neutrophils, Absolute 02/26/2024 4.36  1.70 - 7.00 10*3/mm3 Final    Lymphocytes, Absolute 02/26/2024 1.71  0.70 - 3.10 10*3/mm3 Final    Monocytes, Absolute 02/26/2024 0.47  0.10 - 0.90 10*3/mm3 Final    Eosinophils, Absolute 02/26/2024 0.12  0.00 - 0.40 10*3/mm3 Final    Basophils, Absolute 02/26/2024 0.03  0.00 - 0.20 10*3/mm3 Final    Immature Grans, Absolute 02/26/2024 0.05  0.00 - 0.05 10*3/mm3 Final    nRBC 02/26/2024 0.0  0.0 - 0.2 /100 WBC Final   Office Visit on 02/26/2024   Component Date Value Ref Range Status    Hemoglobin A1C 02/27/2024 6.4 (A)  4.5 - 5.7 % Final    Lot Number 02/27/2024 [id number]   Final    Expiration Date 02/27/2024  06/11/2025   Final                             Assessment and Plan      Diagnoses and all orders for this visit:    1. Prediabetes (Primary)  Comments:  A1c 6.4 today in office; discussed with patient.  Decrease sugar and carbs in diet, increase activity.  Orders:  -     POC Glycosylated Hemoglobin (Hb A1C)    2. Hypertension, unspecified type  Comments:  Stable; restart medications as directed--losartan 50 Mg daily, Toprol-XL 50 Mg daily, HCTZ 25 Mg daily, check labs and follow-up in 6 months  Orders:  -     hydroCHLOROthiazide 25 MG tablet; Take 1 tablet by mouth Daily.  Dispense: 90 tablet; Refill: 1  -     losartan (COZAAR) 50 MG tablet; Take 1 tablet by mouth Daily.  Dispense: 90 tablet; Refill: 1  -     metoprolol succinate XL (TOPROL-XL) 50 MG 24 hr tablet; Take 1 tablet by mouth 2 (Two) Times a Day.  Dispense: 180 tablet; Refill: 1  -     Comprehensive Metabolic Panel; Future  -     CBC & Differential; Future  -     Lipid Panel; Future  -     TSH; Future    3. Screening for prostate cancer  -     PSA Screen; Future    4. Other fatigue  -     Vitamin D,25-Hydroxy; Future    5. Other insomnia  Comments:  New problem: Trial of trazodone 50 Mg nightly as needed.  Administration and side effects discussed.  Follow-up in 6 months  Orders:  -     traZODone (DESYREL) 50 MG tablet; Take 1 tablet by mouth At Night As Needed for Sleep.  Dispense: 90 tablet; Refill: 1    6. Class 1 obesity due to excess calories with serious comorbidity and body mass index (BMI) of 34.0 to 34.9 in adult            Follow Up     Return in about 6 months (around 8/26/2024).    Patient was given instructions and counseling regarding his condition or for health maintenance advice. Please see specific information pulled into the AVS if appropriate.

## 2024-02-27 LAB
EXPIRATION DATE: ABNORMAL
HBA1C MFR BLD: 6.4 % (ref 4.5–5.7)
Lab: ABNORMAL

## 2024-07-16 ENCOUNTER — TELEPHONE (OUTPATIENT)
Dept: FAMILY MEDICINE CLINIC | Facility: CLINIC | Age: 54
End: 2024-07-16
Payer: COMMERCIAL

## 2024-07-16 NOTE — TELEPHONE ENCOUNTER
I need more information on patient's symptoms. Is this intermittent numbness in his extremities that comes and goes or is he having arm numbness and slurred speech and needs to go to the ER?

## 2024-07-16 NOTE — TELEPHONE ENCOUNTER
Patient is requesting an appt. this week for neuro issues. Between 7/16/24 - 07/19/24. Next open appt for TR is August.

## 2024-07-16 NOTE — TELEPHONE ENCOUNTER
Hub staff attempted to follow warm transfer process and was unsuccessful     Caller: Carlos Mcgregor    Relationship to patient: Self    Best call back number: [phone]    Patient is needing: CALLER STATED: RETURNING CALL AND REQUESTING A CALL BACK

## 2024-07-17 NOTE — TELEPHONE ENCOUNTER
Can patient come in at 8am on Friday 7/19? I will probably need to schedule the appt if he can come as its out of my regular work day.

## 2024-07-17 NOTE — TELEPHONE ENCOUNTER
CALLED PATIENT TO GET MORE INFO. PATIENT STATES WHEN HE HAS A BAD COUGH HIS LEFT ARM GOES NUMB. HE STATES IT GOES BACK TO NORMAL AFTER ABOUT 10 MINUTES. PATIENT STATES GOING OUT OF TOWN NEXT WEEK AND JUST WANTS TO BE SEEN BEFORE OUT OF TOWN.

## 2024-07-18 ENCOUNTER — OFFICE VISIT (OUTPATIENT)
Dept: FAMILY MEDICINE CLINIC | Facility: CLINIC | Age: 54
End: 2024-07-18
Payer: COMMERCIAL

## 2024-07-18 VITALS
OXYGEN SATURATION: 97 % | DIASTOLIC BLOOD PRESSURE: 91 MMHG | HEIGHT: 72 IN | SYSTOLIC BLOOD PRESSURE: 133 MMHG | HEART RATE: 75 BPM | BODY MASS INDEX: 33.38 KG/M2 | WEIGHT: 246.4 LBS

## 2024-07-18 DIAGNOSIS — J45.40 MODERATE PERSISTENT REACTIVE AIRWAY DISEASE WITHOUT COMPLICATION: Primary | ICD-10-CM

## 2024-07-18 DIAGNOSIS — I10 HYPERTENSION, UNSPECIFIED TYPE: Chronic | ICD-10-CM

## 2024-07-18 DIAGNOSIS — G62.9 NEUROPATHY: Chronic | ICD-10-CM

## 2024-07-18 PROCEDURE — 99214 OFFICE O/P EST MOD 30 MIN: CPT | Performed by: PHYSICIAN ASSISTANT

## 2024-07-18 RX ORDER — HYDROCHLOROTHIAZIDE 25 MG/1
25 TABLET ORAL DAILY
Qty: 90 TABLET | Refills: 1 | Status: SHIPPED | OUTPATIENT
Start: 2024-07-18

## 2024-07-18 RX ORDER — BUDESONIDE AND FORMOTEROL FUMARATE DIHYDRATE 80; 4.5 UG/1; UG/1
2 AEROSOL RESPIRATORY (INHALATION)
Qty: 10.2 G | Refills: 12 | Status: SHIPPED | OUTPATIENT
Start: 2024-07-18

## 2024-07-18 RX ORDER — LOSARTAN POTASSIUM 50 MG/1
50 TABLET ORAL DAILY
Qty: 90 TABLET | Refills: 1 | Status: SHIPPED | OUTPATIENT
Start: 2024-07-18

## 2024-07-18 RX ORDER — METOPROLOL SUCCINATE 50 MG/1
50 TABLET, EXTENDED RELEASE ORAL 2 TIMES DAILY
Qty: 180 TABLET | Refills: 1 | Status: SHIPPED | OUTPATIENT
Start: 2024-07-18

## 2024-07-18 NOTE — PROGRESS NOTES
Chief Complaint  Chief Complaint   Patient presents with    Cough     Pt states he's always having a coughing problem.    Numbness     Pt c/o R arm numbness and tingling for 3 months.          Subjective          Carlos Mcgregor presents to [facility] for cough and intermittent arm numbness.    History of Present Illness    Patient states that he has had coughing fits for approximately 30 years; in the past 6 months patient has noticed increased left arm numbness and tingling.  He also notes some dizziness.  Patient also states left arm weakness and left hand joint pain for the past year.  Patient states most recent symptoms occurred on 7/16/2024.  Patient states he has approximately 2-4 episodes per month.  Patient states neuropathy symptoms have been going on for quite some time and that he has had III nerve conduction studies and seen multiple neurologist without findings.  Patient states he can travel to other parts of the country without symptoms.  Patient has tried taking allergy medication without improvement.  Patient had chest x-ray on 12/8/2023 and 10/30/2023 which were both normal.  I do not have the workup of his nerve conduction studies as they were completed while we were on a different system.  I will research [facility] for those studies.    Blood pressure rechecked left arm was 112/74; right arm was 112/70    Medical History: has a past medical history of Allergic rhinitis due to allergen (11/02/2020), Arthritis, Elevated glucose (11/02/2020), Elevated LFTs, Essential hypertension (11/02/2020), Fatty liver, Foot pain, left (09/27/2018), Foreign body in soft tissue (09/27/2018), GERD (gastroesophageal reflux disease) (11/02/2020), Heel pain, HLD (hyperlipidemia), Hypertension, Ingrowing toenail, and Numbness in feet.   Surgical History: has a past surgical history that includes US Guided Fine Needle Aspiration (09/09/2019); US Guided Fine Needle Aspiration  "(09/10/2019); Colonoscopy; Esophagogastroduodenoscopy; and Colonoscopy (N/A, 4/21/2022).   Family History: family history includes Diabetes in his maternal grandfather and mother; Heart disease in an other family member; Hypertension in an other family member.   Social History: reports that he has never smoked. He has never used smokeless tobacco. He reports current alcohol use. He reports that he does not use drugs.    Allergies: Latex    Health Maintenance Due   Topic Date Due    ANNUAL PHYSICAL  Never done    COVID-19 Vaccine (3 - 2023-24 season) 09/01/2023    INFLUENZA VACCINE  08/01/2024       Objective     Vitals:    07/18/24 1159   BP: 133/91   Pulse: 75   SpO2: 97%   Weight: 112 kg (246 lb 6.4 oz)   Height: 182.9 cm (72\")     Body mass index is 33.42 kg/m².     Wt Readings from Last 3 Encounters:   07/18/24 112 kg (246 lb 6.4 oz)   02/26/24 115 kg (253 lb 12.8 oz)   12/08/23 109 kg (241 lb)     BP Readings from Last 3 Encounters:   07/18/24 133/91   02/26/24 122/88   12/08/23 125/79     Patient Care Team:  Dania Minor PA as PCP - General (Family Medicine)  Gege Dee PA-C as Consulting Physician (Dermatology)  Paolo Oliver MD as Consulting Physician (General Surgery)  Kathie Rodriguez APRN as Nurse Practitioner (Nurse Practitioner)    Physical Exam  Vitals and nursing note reviewed.   Constitutional:       Appearance: Normal appearance.   HENT:      Head: Normocephalic and atraumatic.   Neck:      Vascular: No carotid bruit.      Comments: Thyroid : gland size normal, nontender, no nodules or masses present on palpation   Cardiovascular:      Rate and Rhythm: Normal rate and regular rhythm.      Pulses: Normal pulses.      Heart sounds: Normal heart sounds.   Pulmonary:      Effort: Pulmonary effort is normal.      Breath sounds: Normal breath sounds.   Musculoskeletal:      Cervical back: Neck supple. No tenderness.   Lymphadenopathy:      Cervical: No cervical adenopathy. "   Neurological:      General: No focal deficit present.      Mental Status: He is alert.   Psychiatric:         Mood and Affect: Mood normal.         Behavior: Behavior normal.           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 Moderate persistent reactive airway disease without complication (Primary)  Comments:  Chronic cough of uncertain etiology/suggestive of reactive airway; trial of Symbicort 2 puffs twice daily; adimin and side effects discussed; pt to rinse mouth  Orders:  -     budesonide-formoterol (Symbicort) 80-4.5 MCG/ACT inhaler; Inhale 2 puffs 2 (Two) Times a Day.  Dispense: 10.2 g; Refill: 12    2. Hypertension, unspecified type  Comments:  Stable; Continue losartan 50 Mg daily, Toprol-XL 50 Mg daily, HCTZ 25 Mg daily, check labs and follow-up in 6 months  Orders:  -     hydroCHLOROthiazide 25 MG tablet; Take 1 tablet by mouth Daily.  Dispense: 90 tablet; Refill: 1  -     losartan (COZAAR) 50 MG tablet; Take 1 tablet by mouth Daily.  Dispense: 90 tablet; Refill: 1  -     metoprolol succinate XL (TOPROL-XL) 50 MG 24 hr tablet; Take 1 tablet by mouth 2 (Two) Times a Day.  Dispense: 180 tablet; Refill: 1    3. Neuropathy  Comments:  Worsening problem needing further evaluation; if labs are negative; consider referral to neuro in [place].  Orders:  -     Vitamin B12; Future  -     Folate; Future  -     Vitamin B1, Whole Blood; Future  -     Sedimentation Rate; Future  -     C-reactive protein; Future  -     RASHAAD; Future  -     Comprehensive Metabolic Panel; Future  -     Heavy Metals Screen, Urine - Urine, Clean Catch; Future  -     Hemoglobin A1c; Future  -     TSH+Free T4; Future  -     Homocysteine; Future  -     Protein Elec + Interp, Serum; Future  -     Rheumatoid Factor; Future            Follow Up     Return for After labs/tests.    Patient was given instructions and  counseling regarding his condition or for health maintenance advice. Please see specific information pulled into the AVS if appropriate.

## 2024-07-18 NOTE — Clinical Note
Please let patient know I have reviewed previous nerve conduction studies and ordered lab testing for further evaluation of his symptoms.

## 2024-08-05 ENCOUNTER — LAB (OUTPATIENT)
Dept: LAB | Facility: HOSPITAL | Age: 54
End: 2024-08-05
Payer: COMMERCIAL

## 2024-08-05 ENCOUNTER — TELEPHONE (OUTPATIENT)
Dept: FAMILY MEDICINE CLINIC | Facility: CLINIC | Age: 54
End: 2024-08-05
Payer: COMMERCIAL

## 2024-08-05 DIAGNOSIS — G62.9 NEUROPATHY: Chronic | ICD-10-CM

## 2024-08-05 LAB
ALBUMIN SERPL-MCNC: 4.5 G/DL (ref 3.5–5.2)
ALBUMIN/GLOB SERPL: 1.8 G/DL
ALP SERPL-CCNC: 87 U/L (ref 39–117)
ALT SERPL W P-5'-P-CCNC: 100 U/L (ref 1–41)
ANION GAP SERPL CALCULATED.3IONS-SCNC: 10.3 MMOL/L (ref 5–15)
AST SERPL-CCNC: 35 U/L (ref 1–40)
BILIRUB SERPL-MCNC: 0.4 MG/DL (ref 0–1.2)
BUN SERPL-MCNC: 17 MG/DL (ref 6–20)
BUN/CREAT SERPL: 15.6 (ref 7–25)
CALCIUM SPEC-SCNC: 9.6 MG/DL (ref 8.6–10.5)
CHLORIDE SERPL-SCNC: 102 MMOL/L (ref 98–107)
CHROMATIN AB SERPL-ACNC: <10 IU/ML (ref 0–14)
CO2 SERPL-SCNC: 25.7 MMOL/L (ref 22–29)
CREAT SERPL-MCNC: 1.09 MG/DL (ref 0.76–1.27)
CRP SERPL-MCNC: <0.3 MG/DL (ref 0–0.5)
EGFRCR SERPLBLD CKD-EPI 2021: 80.2 ML/MIN/1.73
ERYTHROCYTE [SEDIMENTATION RATE] IN BLOOD: 4 MM/HR (ref 0–20)
FOLATE SERPL-MCNC: 8.1 NG/ML (ref 4.78–24.2)
GLOBULIN UR ELPH-MCNC: 2.5 GM/DL
GLUCOSE SERPL-MCNC: 103 MG/DL (ref 65–99)
HBA1C MFR BLD: 6 % (ref 4.8–5.6)
HCYS SERPL-MCNC: 8.9 UMOL/L (ref 0–15)
POTASSIUM SERPL-SCNC: 3.7 MMOL/L (ref 3.5–5.2)
PROT SERPL-MCNC: 7 G/DL (ref 6–8.5)
SODIUM SERPL-SCNC: 138 MMOL/L (ref 136–145)
T4 FREE SERPL-MCNC: 1.04 NG/DL (ref 0.92–1.68)
TSH SERPL DL<=0.05 MIU/L-ACNC: 0.58 UIU/ML (ref 0.27–4.2)
VIT B12 BLD-MCNC: 596 PG/ML (ref 211–946)

## 2024-08-05 PROCEDURE — 83090 ASSAY OF HOMOCYSTEINE: CPT

## 2024-08-05 PROCEDURE — 84443 ASSAY THYROID STIM HORMONE: CPT

## 2024-08-05 PROCEDURE — 84439 ASSAY OF FREE THYROXINE: CPT

## 2024-08-05 PROCEDURE — 86038 ANTINUCLEAR ANTIBODIES: CPT

## 2024-08-05 PROCEDURE — 82607 VITAMIN B-12: CPT

## 2024-08-05 PROCEDURE — 86431 RHEUMATOID FACTOR QUANT: CPT

## 2024-08-05 PROCEDURE — 84425 ASSAY OF VITAMIN B-1: CPT

## 2024-08-05 PROCEDURE — 83036 HEMOGLOBIN GLYCOSYLATED A1C: CPT

## 2024-08-05 PROCEDURE — 84165 PROTEIN E-PHORESIS SERUM: CPT

## 2024-08-05 PROCEDURE — 80053 COMPREHEN METABOLIC PANEL: CPT

## 2024-08-05 PROCEDURE — 85652 RBC SED RATE AUTOMATED: CPT

## 2024-08-05 PROCEDURE — 36415 COLL VENOUS BLD VENIPUNCTURE: CPT

## 2024-08-05 PROCEDURE — 82746 ASSAY OF FOLIC ACID SERUM: CPT

## 2024-08-05 PROCEDURE — 86140 C-REACTIVE PROTEIN: CPT

## 2024-08-05 NOTE — TELEPHONE ENCOUNTER
HUB TO RELAY:    Please let patient know Dania reviewed previous nerve conduction studies and ordered lab testing for further evaluation of his symptoms.

## 2024-08-07 LAB
ALBUMIN SERPL ELPH-MCNC: 3.9 G/DL (ref 2.9–4.4)
ALBUMIN/GLOB SERPL: 1.3 {RATIO} (ref 0.7–1.7)
ALPHA1 GLOB SERPL ELPH-MCNC: 0.2 G/DL (ref 0–0.4)
ALPHA2 GLOB SERPL ELPH-MCNC: 0.8 G/DL (ref 0.4–1)
ANA SER QL: NEGATIVE
B-GLOBULIN SERPL ELPH-MCNC: 1.2 G/DL (ref 0.7–1.3)
GAMMA GLOB SERPL ELPH-MCNC: 0.8 G/DL (ref 0.4–1.8)
GLOBULIN SER CALC-MCNC: 3 G/DL (ref 2.2–3.9)
LABORATORY COMMENT REPORT: NORMAL
M PROTEIN SERPL ELPH-MCNC: NORMAL G/DL
PROT PATTERN SERPL ELPH-IMP: NORMAL
PROT SERPL-MCNC: 6.9 G/DL (ref 6–8.5)

## 2024-08-10 LAB — VIT B1 BLD-SCNC: 121 NMOL/L (ref 66.5–200)

## 2024-08-19 ENCOUNTER — OFFICE VISIT (OUTPATIENT)
Dept: FAMILY MEDICINE CLINIC | Facility: CLINIC | Age: 54
End: 2024-08-19
Payer: COMMERCIAL

## 2024-08-19 VITALS
RESPIRATION RATE: 18 BRPM | WEIGHT: 250.8 LBS | BODY MASS INDEX: 34.01 KG/M2 | DIASTOLIC BLOOD PRESSURE: 80 MMHG | SYSTOLIC BLOOD PRESSURE: 122 MMHG | OXYGEN SATURATION: 96 % | HEART RATE: 80 BPM

## 2024-08-19 DIAGNOSIS — E66.09 CLASS 1 OBESITY DUE TO EXCESS CALORIES WITH SERIOUS COMORBIDITY AND BODY MASS INDEX (BMI) OF 34.0 TO 34.9 IN ADULT: ICD-10-CM

## 2024-08-19 DIAGNOSIS — R05.3 CHRONIC COUGH: Chronic | ICD-10-CM

## 2024-08-19 DIAGNOSIS — I10 ESSENTIAL HYPERTENSION: Chronic | ICD-10-CM

## 2024-08-19 DIAGNOSIS — G62.9 POLYNEUROPATHY: Primary | Chronic | ICD-10-CM

## 2024-08-19 PROCEDURE — 99214 OFFICE O/P EST MOD 30 MIN: CPT | Performed by: PHYSICIAN ASSISTANT

## 2024-08-19 NOTE — PROGRESS NOTES
Chief Complaint  Chief Complaint   Patient presents with    Follow-up     6 month     Hypertension       Subjective          Carlos Mcgregor presents to [facility] for 6 month follow up.     History of Present Illness    HTN: Patient presents for hypertension management. Patient is currently taking HCTZ, Losartan, and Metoprolol and is consistent with medication. Patient denies chest pain, shortness of air and edema. Patient does check blood pressure at home with an average reading of 130/90; recheck manual bp was 122/80.    In regards to chronic cough, patient was put on Symbicort at last visit it does help however he is not using daily as it is too expensive.  I suggested that the patient contact his insurance to see what is on formulary in regards to an inhaled corticosteroid and get back to me.    In regards to neuropathy as suggested a neurology referral in [place]; patient stated to hold on a new referral.    Medical History: has a past medical history of Allergic rhinitis due to allergen (11/02/2020), Arthritis, Elevated glucose (11/02/2020), Elevated LFTs, Essential hypertension (11/02/2020), Fatty liver, Foot pain, left (09/27/2018), Foreign body in soft tissue (09/27/2018), GERD (gastroesophageal reflux disease) (11/02/2020), Heel pain, HLD (hyperlipidemia), Hypertension, Ingrowing toenail, and Numbness in feet.   Surgical History: has a past surgical history that includes US Guided Fine Needle Aspiration (09/09/2019); US Guided Fine Needle Aspiration (09/10/2019); Colonoscopy; Esophagogastroduodenoscopy; and Colonoscopy (N/A, 4/21/2022).   Family History: family history includes Diabetes in his maternal grandfather and mother; Heart disease in an other family member; Hypertension in an other family member.   Social History: reports that he has never smoked. He has never used smokeless tobacco. He reports current alcohol use. He reports that he does not use  drugs.    Allergies: Latex    Health Maintenance Due   Topic Date Due    ANNUAL PHYSICAL  Never done    INFLUENZA VACCINE  08/01/2024       Objective     Vitals:    08/19/24 0913 08/19/24 0943   BP: 132/100 122/80   Pulse: 80    Resp: 18    SpO2: 96%    Weight: 114 kg (250 lb 12.8 oz)      Body mass index is 34.01 kg/m².     Wt Readings from Last 3 Encounters:   08/19/24 114 kg (250 lb 12.8 oz)   07/18/24 112 kg (246 lb 6.4 oz)   02/26/24 115 kg (253 lb 12.8 oz)     BP Readings from Last 3 Encounters:   08/19/24 122/80   07/18/24 133/91   02/26/24 122/88     Patient Care Team:  Dania Minor PA as PCP - General (Family Medicine)  Gege Dee PA-C as Consulting Physician (Dermatology)  Paolo Oliver MD as Consulting Physician (General Surgery)  Kathie Rodriguez APRN as Nurse Practitioner (Nurse Practitioner)    Physical Exam  Vitals and nursing note reviewed.   Constitutional:       Appearance: Normal appearance. He is obese.   HENT:      Head: Normocephalic and atraumatic.   Neck:      Vascular: No carotid bruit.      Comments: Thyroid : gland size normal, nontender, no nodules or masses present on palpation   Cardiovascular:      Rate and Rhythm: Normal rate and regular rhythm.      Pulses: Normal pulses.      Heart sounds: Normal heart sounds.   Pulmonary:      Effort: Pulmonary effort is normal.      Breath sounds: Normal breath sounds.   Musculoskeletal:      Cervical back: Neck supple. No tenderness.      Right lower leg: No edema.      Left lower leg: No edema.   Lymphadenopathy:      Cervical: No cervical adenopathy.   Neurological:      Mental Status: He is alert.   Psychiatric:         Mood and Affect: Mood normal.         Behavior: Behavior normal.           Result Review :   Lab on 08/05/2024   Component Date Value Ref Range Status    Vitamin B-12 08/05/2024 596  211 - 946 pg/mL Final    Folate 08/05/2024 8.10  4.78 - 24.20 ng/mL Final    Vitamin B1, Whole Blood 08/05/2024 121.0   66.5 - 200.0 nmol/L Final    Sed Rate 08/05/2024 4  0 - 20 mm/hr Final    C-Reactive Protein 08/05/2024 <0.30  0.00 - 0.50 mg/dL Final    RASHAAD Direct 08/05/2024 Negative  Negative Final    Serum is slightly lipemic.    Glucose 08/05/2024 103 (H)  65 - 99 mg/dL Final    BUN 08/05/2024 17  6 - 20 mg/dL Final    Creatinine 08/05/2024 1.09  0.76 - 1.27 mg/dL Final    Sodium 08/05/2024 138  136 - 145 mmol/L Final    Potassium 08/05/2024 3.7  3.5 - 5.2 mmol/L Final    Chloride 08/05/2024 102  98 - 107 mmol/L Final    CO2 08/05/2024 25.7  22.0 - 29.0 mmol/L Final    Calcium 08/05/2024 9.6  8.6 - 10.5 mg/dL Final    Total Protein 08/05/2024 7.0  6.0 - 8.5 g/dL Final    Albumin 08/05/2024 4.5  3.5 - 5.2 g/dL Final    ALT (SGPT) 08/05/2024 100 (H)  1 - 41 U/L Final    AST (SGOT) 08/05/2024 35  1 - 40 U/L Final    Alkaline Phosphatase 08/05/2024 87  39 - 117 U/L Final    Total Bilirubin 08/05/2024 0.4  0.0 - 1.2 mg/dL Final    Globulin 08/05/2024 2.5  gm/dL Final    A/G Ratio 08/05/2024 1.8  g/dL Final    BUN/Creatinine Ratio 08/05/2024 15.6  7.0 - 25.0 Final    Anion Gap 08/05/2024 10.3  5.0 - 15.0 mmol/L Final    eGFR 08/05/2024 80.2  >60.0 mL/min/1.73 Final    Hemoglobin A1C 08/05/2024 6.00 (H)  4.80 - 5.60 % Final    TSH 08/05/2024 0.584  0.270 - 4.200 uIU/mL Final    Free T4 08/05/2024 1.04  0.92 - 1.68 ng/dL Final    Homocysteine, Plasma (Quant) 08/05/2024 8.9  0.0 - 15.0 umol/L Final    Total Protein 08/05/2024 6.9  6.0 - 8.5 g/dL Final    Albumin 08/05/2024 3.9  2.9 - 4.4 g/dL Final    Alpha-1-Globulin 08/05/2024 0.2  0.0 - 0.4 g/dL Final    Alpha-2-Globulin 08/05/2024 0.8  0.4 - 1.0 g/dL Final    Beta Globulin 08/05/2024 1.2  0.7 - 1.3 g/dL Final    Gamma Globulin 08/05/2024 0.8  0.4 - 1.8 g/dL Final    M-Jonatan 08/05/2024 Not Observed  Not Observed g/dL Final    Globulin 08/05/2024 3.0  2.2 - 3.9 g/dL Final    A/G Ratio 08/05/2024 1.3  0.7 - 1.7 Final    Please note 08/05/2024 Comment   Final    Protein  electrophoresis scan will follow via computer, mail, or   delivery.    SPE Interpretation 08/05/2024 Comment   Final    The SPE pattern appears unremarkable. Evidence of monoclonal  protein is not apparent.    Rheumatoid Factor Quantitative 08/05/2024 <10.0  0.0 - 14.0 IU/mL Final               Assessment and Plan      Diagnoses and all orders for this visit:    1. Polyneuropathy (Primary)  Comments:  Suggested referral to new neurologist, patient wants to wait.    2. Essential hypertension  Comments:  Stable on losartan 50 Mg daily, metoprolol XL 50 Mg twice daily and hydrochlorothiazide 25 Mg daily    3. Class 1 obesity due to excess calories with serious comorbidity and body mass index (BMI) of 34.0 to 34.9 in adult    4. Chronic cough  Comments:  Slight improvement with Symbicort; patient states too expensive.  Patient to contact his insurance to see what medication is on formulary            Follow Up     Return in about 6 months (around 2/19/2025).    Patient was given instructions and counseling regarding his condition or for health maintenance advice. Please see specific information pulled into the AVS if appropriate.

## 2024-09-28 DIAGNOSIS — G47.09 OTHER INSOMNIA: Chronic | ICD-10-CM

## 2024-09-30 RX ORDER — TRAZODONE HYDROCHLORIDE 50 MG/1
50 TABLET, FILM COATED ORAL NIGHTLY PRN
Qty: 90 TABLET | Refills: 1 | OUTPATIENT
Start: 2024-09-30

## 2025-02-26 ENCOUNTER — OFFICE VISIT (OUTPATIENT)
Dept: FAMILY MEDICINE CLINIC | Facility: CLINIC | Age: 55
End: 2025-02-26
Payer: COMMERCIAL

## 2025-02-26 VITALS
OXYGEN SATURATION: 95 % | DIASTOLIC BLOOD PRESSURE: 90 MMHG | WEIGHT: 255 LBS | BODY MASS INDEX: 34.54 KG/M2 | HEIGHT: 72 IN | SYSTOLIC BLOOD PRESSURE: 116 MMHG | HEART RATE: 86 BPM

## 2025-02-26 DIAGNOSIS — I10 HYPERTENSION, UNSPECIFIED TYPE: Primary | Chronic | ICD-10-CM

## 2025-02-26 DIAGNOSIS — R53.83 OTHER FATIGUE: ICD-10-CM

## 2025-02-26 DIAGNOSIS — E78.00 ELEVATED LDL CHOLESTEROL LEVEL: Chronic | ICD-10-CM

## 2025-02-26 DIAGNOSIS — R73.03 PREDIABETES: ICD-10-CM

## 2025-02-26 LAB
EXPIRATION DATE: ABNORMAL
HBA1C MFR BLD: 6.3 % (ref 4.5–5.7)
Lab: ABNORMAL

## 2025-02-26 RX ORDER — METOPROLOL SUCCINATE 50 MG/1
50 TABLET, EXTENDED RELEASE ORAL 2 TIMES DAILY
Qty: 180 TABLET | Refills: 1 | Status: SHIPPED | OUTPATIENT
Start: 2025-02-26

## 2025-02-26 RX ORDER — LOSARTAN POTASSIUM 50 MG/1
50 TABLET ORAL DAILY
Qty: 90 TABLET | Refills: 1 | Status: SHIPPED | OUTPATIENT
Start: 2025-02-26

## 2025-02-26 RX ORDER — HYDROCHLOROTHIAZIDE 25 MG/1
25 TABLET ORAL DAILY
Qty: 90 TABLET | Refills: 1 | Status: SHIPPED | OUTPATIENT
Start: 2025-02-26

## 2025-02-26 RX ORDER — ROSUVASTATIN CALCIUM 5 MG/1
5 TABLET, COATED ORAL DAILY
Qty: 90 TABLET | Refills: 0 | Status: SHIPPED | OUTPATIENT
Start: 2025-02-26

## 2025-02-26 NOTE — PROGRESS NOTES
Chief Complaint  Chief Complaint   Patient presents with    Follow-up     6 mo    Diabetes     Increased blood sugars. Fasting glucose increased from 100s-140s.   Pt has tried fasting and only eating one meal a day to try and control blood sugars.       Subjective          Carlos Mcgregor presents to [facility] for 6mth f/u.    History of Present Illness    HTN: Patient presents for hypertension management. Patient is currently taking HCTZ, Losartan, and Metoprolol and has not been consistent with medication. Patient denies chest pain, shortness of air. States diuretic is not working due to edema; but admits to not taking it. Recheck manual bp was 116/90; patient advised to take all medication as prescribed.     In regards to chronic cough, patient was put on Symbicort at a previous visit but he is not using secondary to cost.  I suggested that the patient contact his insurance to see what is on formulary in regards to an inhaled corticosteroid and get back to me; he did not.     In regards to neuropathy as suggested a neurology referral in [place]; patient stated to hold on a new referral.    Patient c/o decreased energy, elevated glucose readings, and gaining weight despite eating less.    Medical History: has a past medical history of Allergic rhinitis due to allergen (11/02/2020), Arthritis, Elevated glucose (11/02/2020), Elevated LFTs, Essential hypertension (11/02/2020), Fatty liver, Foot pain, left (09/27/2018), Foreign body in soft tissue (09/27/2018), GERD (gastroesophageal reflux disease) (11/02/2020), Heel pain, HLD (hyperlipidemia), Hypertension, Ingrowing toenail, and Numbness in feet.   Surgical History: has a past surgical history that includes US Guided Fine Needle Aspiration (09/09/2019); US Guided Fine Needle Aspiration (09/10/2019); Colonoscopy; Esophagogastroduodenoscopy; and Colonoscopy (N/A, 4/21/2022).   Family History: family history includes Diabetes in  "his maternal grandfather and mother; Heart disease in an other family member; Hypertension in an other family member.   Social History: reports that he has never smoked. He has never been exposed to tobacco smoke. He has never used smokeless tobacco. He reports current alcohol use. He reports that he does not use drugs.    Allergies: Latex    Health Maintenance Due   Topic Date Due    ZOSTER VACCINE (1 of 2) Never done    ANNUAL PHYSICAL  Never done    COVID-19 Vaccine (3 - 2024-25 season) 09/01/2024    LIPID PANEL  02/26/2025    BMI FOLLOWUP  02/26/2025       Objective     Vitals:    02/26/25 1144 02/26/25 1252   BP: 128/92 116/90   BP Location: Right arm    Patient Position: Sitting    Cuff Size: Large Adult    Pulse: 86    SpO2: 95%    Weight: 116 kg (255 lb)    Height: 182.9 cm (72.01\")      Body mass index is 34.57 kg/m².     Wt Readings from Last 3 Encounters:   02/26/25 116 kg (255 lb)   11/07/24 114 kg (251 lb 5.2 oz)   08/19/24 114 kg (250 lb 12.8 oz)     BP Readings from Last 3 Encounters:   02/26/25 116/90   11/07/24 139/74   08/19/24 122/80       Patient Care Team:  Dania Minor PA as PCP - General (Family Medicine)  Gege Dee PA-C as Consulting Physician (Dermatology)  Paolo Oliver MD as Consulting Physician (General Surgery)  Kathie Rodriguez APRN as Nurse Practitioner (Nurse Practitioner)    Physical Exam  Vitals and nursing note reviewed.   Constitutional:       Appearance: Normal appearance. He is obese.   HENT:      Head: Normocephalic and atraumatic.   Neck:      Vascular: No carotid bruit.      Comments: Thyroid : gland size normal, nontender, no nodules or masses present on palpation   Cardiovascular:      Rate and Rhythm: Normal rate and regular rhythm.      Pulses: Normal pulses.      Heart sounds: Normal heart sounds.   Pulmonary:      Effort: Pulmonary effort is normal.      Breath sounds: Normal breath sounds.   Musculoskeletal:      Cervical back: Neck " supple. No tenderness.      Right lower leg: Edema present.      Left lower leg: Edema present.      Comments: Trace bilateral lower extremity edema.   Lymphadenopathy:      Cervical: No cervical adenopathy.   Neurological:      Mental Status: He is alert.   Psychiatric:         Mood and Affect: Mood normal.         Behavior: Behavior normal.           Result Review :   Office Visit on 02/26/2025   Component Date Value Ref Range Status    Hemoglobin A1C 02/26/2025 6.3 (A)  4.5 - 5.7 % Final    Lot Number 02/26/2025 [id number]   Final    Expiration Date 02/26/2025 10/4/2026   Final                Assessment and Plan      Diagnoses and all orders for this visit:    1. Hypertension, unspecified type (Primary)  Comments:  Not stable; Continue losartan 50 Mg daily, Toprol-XL 50 Mg daily, HCTZ 25 Mg daily, ensure taking all medications as prescribed; monitor and f/u in 2mths.  Orders:  -     hydroCHLOROthiazide 25 MG tablet; Take 1 tablet by mouth Daily.  Dispense: 90 tablet; Refill: 1  -     losartan (COZAAR) 50 MG tablet; Take 1 tablet by mouth Daily.  Dispense: 90 tablet; Refill: 1  -     metoprolol succinate XL (TOPROL-XL) 50 MG 24 hr tablet; Take 1 tablet by mouth 2 (Two) Times a Day.  Dispense: 180 tablet; Refill: 1    2. Elevated LDL cholesterol level  Comments:  Previously untreated: trial of Crestor 5mg daily; check labs in about 2mths. Admin and SE discussed.  Orders:  -     rosuvastatin (Crestor) 5 MG tablet; Take 1 tablet by mouth Daily.  Dispense: 90 tablet; Refill: 0  -     Comprehensive Metabolic Panel; Future  -     Lipid Panel; Future    3. Other fatigue  Comments:  Check labs for further eval.  Orders:  -     Testosterone; Future    4. Prediabetes  Comments:  Trial of Metformin 500mg with evening meal; recheck in about 2 mths. Admin and SE discussed.  Orders:  -     POC Glycosylated Hemoglobin (Hb A1C)  -     metFORMIN (GLUCOPHAGE) 500 MG tablet; Take 1 tablet by mouth Daily Before Supper.  Dispense: 90  tablet; Refill: 0  -     Hemoglobin A1c; Future    Other orders  -     Fluzone >6mos (0705-0961)            Follow Up     Return in about 2 months (around 4/26/2025).    Patient was given instructions and counseling regarding his condition or for health maintenance advice. Please see specific information pulled into the AVS if appropriate.

## 2025-03-03 DIAGNOSIS — I10 HYPERTENSION, UNSPECIFIED TYPE: Chronic | ICD-10-CM

## 2025-03-03 RX ORDER — LOSARTAN POTASSIUM 50 MG/1
50 TABLET ORAL DAILY
Qty: 90 TABLET | Refills: 1 | OUTPATIENT
Start: 2025-03-03

## 2025-04-28 ENCOUNTER — LAB (OUTPATIENT)
Dept: LAB | Facility: HOSPITAL | Age: 55
End: 2025-04-28
Payer: COMMERCIAL

## 2025-04-28 DIAGNOSIS — R73.03 PREDIABETES: ICD-10-CM

## 2025-04-28 DIAGNOSIS — E78.00 ELEVATED LDL CHOLESTEROL LEVEL: Chronic | ICD-10-CM

## 2025-04-28 DIAGNOSIS — R53.83 OTHER FATIGUE: ICD-10-CM

## 2025-04-28 DIAGNOSIS — G62.9 NEUROPATHY: Chronic | ICD-10-CM

## 2025-04-28 LAB
ALBUMIN SERPL-MCNC: 4.5 G/DL (ref 3.5–5.2)
ALBUMIN/GLOB SERPL: 1.7 G/DL
ALP SERPL-CCNC: 91 U/L (ref 39–117)
ALT SERPL W P-5'-P-CCNC: 116 U/L (ref 1–41)
ANION GAP SERPL CALCULATED.3IONS-SCNC: 13 MMOL/L (ref 5–15)
AST SERPL-CCNC: 45 U/L (ref 1–40)
BILIRUB SERPL-MCNC: 0.4 MG/DL (ref 0–1.2)
BUN SERPL-MCNC: 16 MG/DL (ref 6–20)
BUN/CREAT SERPL: 14.3 (ref 7–25)
CALCIUM SPEC-SCNC: 9.4 MG/DL (ref 8.6–10.5)
CHLORIDE SERPL-SCNC: 101 MMOL/L (ref 98–107)
CHOLEST SERPL-MCNC: 156 MG/DL (ref 0–200)
CO2 SERPL-SCNC: 25 MMOL/L (ref 22–29)
CREAT SERPL-MCNC: 1.12 MG/DL (ref 0.76–1.27)
EGFRCR SERPLBLD CKD-EPI 2021: 77.1 ML/MIN/1.73
GLOBULIN UR ELPH-MCNC: 2.6 GM/DL
GLUCOSE SERPL-MCNC: 125 MG/DL (ref 65–99)
HBA1C MFR BLD: 6.3 % (ref 4.8–5.6)
HDLC SERPL-MCNC: 30 MG/DL (ref 40–60)
LDLC SERPL CALC-MCNC: 95 MG/DL (ref 0–100)
LDLC/HDLC SERPL: 3.02 {RATIO}
POTASSIUM SERPL-SCNC: 3.9 MMOL/L (ref 3.5–5.2)
PROT SERPL-MCNC: 7.1 G/DL (ref 6–8.5)
SODIUM SERPL-SCNC: 139 MMOL/L (ref 136–145)
TESTOST SERPL-MCNC: 379 NG/DL (ref 193–740)
TRIGL SERPL-MCNC: 177 MG/DL (ref 0–150)
VLDLC SERPL-MCNC: 31 MG/DL (ref 5–40)

## 2025-04-28 PROCEDURE — 80061 LIPID PANEL: CPT

## 2025-04-28 PROCEDURE — 36415 COLL VENOUS BLD VENIPUNCTURE: CPT

## 2025-04-28 PROCEDURE — 80053 COMPREHEN METABOLIC PANEL: CPT

## 2025-04-28 PROCEDURE — 83036 HEMOGLOBIN GLYCOSYLATED A1C: CPT

## 2025-04-28 PROCEDURE — 84403 ASSAY OF TOTAL TESTOSTERONE: CPT

## 2025-04-28 NOTE — PROGRESS NOTES
Chief Complaint  Chief Complaint   Patient presents with    Follow-up     2 month        Subjective          Carlos Mcgregor presents to [facility] for 2mth follow up.    History of Present Illness    HTN: Patient presents for hypertension management. Patient is currently taking HCTZ, Losartan, and Metoprolol and is consistent with medication; bp well controlled today at 128/84.    Impaired glucose tolerance: started on Metformin 500mg daily at last visit; last vist A1c was 6.3; repeat A1c was 6.3 but patient states that he is sleeping better and feels better on Metformin.    HL: put on Crestor 5mg daily at last visit. LDL has improved.    Patient has h/o thyroid nodule 2019--bx showed benign follicular nodule and colloid nodule.  Patient states swelling in the left side of his neck; notes when shaving.    Patient also complains of cough after eating and what appears to be a spasm in his throat.  Patient has tried an inhaler in the past but was too expensive; noted some improvement.  Patient was previously on a PPI and was told he had an ulcer but is no longer taking that medication.  Patient questions if symptoms are psychosomatic as when he is working he has no cough and can talk for an extended period of time but once work stops, the cough restarts.    Labs: 4/28/25  A1c same at 6.3  LDL improved with Crestor  triglycerides (sugary, starchy part of your cholesterol) are elevated; decrease sugars and carbs in diet  LFTs still elevated  Testosterone is normal.    Medical History: has a past medical history of Allergic rhinitis due to allergen (11/02/2020), Arthritis, Elevated glucose (11/02/2020), Elevated LFTs, Essential hypertension (11/02/2020), Fatty liver, Foot pain, left (09/27/2018), Foreign body in soft tissue (09/27/2018), GERD (gastroesophageal reflux disease) (11/02/2020), Heel pain, HLD (hyperlipidemia), Hypertension, Ingrowing toenail, and Numbness in feet.    Surgical History: has a past surgical history that includes US Guided Fine Needle Aspiration (09/09/2019); US Guided Fine Needle Aspiration (09/10/2019); Colonoscopy; Esophagogastroduodenoscopy; and Colonoscopy (N/A, 4/21/2022).   Family History: family history includes Diabetes in his maternal grandfather and mother; Heart disease in an other family member; Hypertension in an other family member.   Social History: reports that he has never smoked. He has never been exposed to tobacco smoke. He has never used smokeless tobacco. He reports current alcohol use. He reports that he does not use drugs.    Allergies: Latex    Health Maintenance Due   Topic Date Due    ANNUAL PHYSICAL  Never done       Objective     Vitals:    04/30/25 1132   BP: 128/84   BP Location: Right arm   Patient Position: Sitting   Cuff Size: Adult   Pulse: 80   Resp: 16   Weight: 115 kg (254 lb 9.6 oz)     Body mass index is 34.52 kg/m².     Wt Readings from Last 3 Encounters:   04/30/25 115 kg (254 lb 9.6 oz)   02/26/25 116 kg (255 lb)   11/07/24 114 kg (251 lb 5.2 oz)     BP Readings from Last 3 Encounters:   04/30/25 128/84   02/26/25 116/90   11/07/24 139/74       Patient Care Team:  Dania Minor PA as PCP - General (Family Medicine)  Gege Dee PA-C as Consulting Physician (Dermatology)  Paolo Oliver MD as Consulting Physician (General Surgery)  Kathie Rodriguez APRN as Nurse Practitioner (Nurse Practitioner)    Physical Exam  Vitals and nursing note reviewed.   Constitutional:       Appearance: Normal appearance. He is obese.   HENT:      Head: Normocephalic and atraumatic.   Neck:      Vascular: No carotid bruit.      Comments: Thyroid : gland size normal, nontender, no nodules or masses present on palpation   Cardiovascular:      Rate and Rhythm: Normal rate and regular rhythm.      Pulses: Normal pulses.      Heart sounds: Normal heart sounds.   Pulmonary:      Effort: Pulmonary effort is normal.       Breath sounds: Normal breath sounds.   Musculoskeletal:      Cervical back: Neck supple. No tenderness.      Right lower leg: No edema.      Left lower leg: No edema.   Lymphadenopathy:      Cervical: Cervical adenopathy (left side.) present.   Neurological:      Mental Status: He is alert.   Psychiatric:         Mood and Affect: Mood normal.         Behavior: Behavior normal.           Result Review :              Assessment and Plan      Diagnoses and all orders for this visit:    1. Thyroid nodule (Primary)  Comments:  Check neck soft tissue u/s.  Orders:  -     US Head Neck Soft Tissue; Future    2. Prediabetes  Comments:  Stable: continue Metformin 500mg with evening meal; f/u in 4mths.  Orders:  -     metFORMIN (GLUCOPHAGE) 500 MG tablet; Take 1 tablet by mouth Daily Before Supper.  Dispense: 90 tablet; Refill: 0  -     Hemoglobin A1c; Future    3. Elevated LDL cholesterol level  Comments:  Improved with Crestor 5mg daily; continue.  Orders:  -     rosuvastatin (Crestor) 5 MG tablet; Take 1 tablet by mouth Daily.  Dispense: 90 tablet; Refill: 0  -     Comprehensive Metabolic Panel; Future  -     Lipid Panel; Future    4. Lymphadenopathy  Comments:  Check u/s of neck.  Orders:  -     US Head Neck Soft Tissue; Future    5. Gastroesophageal reflux disease with esophagitis, unspecified whether hemorrhage  Comments:  Trial of Pepcid 20mg twice daily; consider CT chest and referral to pulm.  Orders:  -     famotidine (Pepcid) 20 MG tablet; Take 1 tablet by mouth 2 (Two) Times a Day.  Dispense: 60 tablet; Refill: 1    6. Chronic cough  Comments:  Trial of Pepcid 20mg twice daily; consider CT chest and referral to pulm.            Follow Up     Return in about 4 months (around 8/30/2025).    Patient was given instructions and counseling regarding his condition or for health maintenance advice. Please see specific information pulled into the AVS if appropriate.

## 2025-04-30 ENCOUNTER — OFFICE VISIT (OUTPATIENT)
Dept: FAMILY MEDICINE CLINIC | Facility: CLINIC | Age: 55
End: 2025-04-30
Payer: COMMERCIAL

## 2025-04-30 VITALS
WEIGHT: 254.6 LBS | SYSTOLIC BLOOD PRESSURE: 128 MMHG | RESPIRATION RATE: 16 BRPM | DIASTOLIC BLOOD PRESSURE: 84 MMHG | BODY MASS INDEX: 34.52 KG/M2 | HEART RATE: 80 BPM

## 2025-04-30 DIAGNOSIS — K21.00 GASTROESOPHAGEAL REFLUX DISEASE WITH ESOPHAGITIS, UNSPECIFIED WHETHER HEMORRHAGE: ICD-10-CM

## 2025-04-30 DIAGNOSIS — E78.00 ELEVATED LDL CHOLESTEROL LEVEL: Chronic | ICD-10-CM

## 2025-04-30 DIAGNOSIS — R05.3 CHRONIC COUGH: ICD-10-CM

## 2025-04-30 DIAGNOSIS — E04.1 THYROID NODULE: Primary | ICD-10-CM

## 2025-04-30 DIAGNOSIS — R59.1 LYMPHADENOPATHY: ICD-10-CM

## 2025-04-30 DIAGNOSIS — R73.03 PREDIABETES: Chronic | ICD-10-CM

## 2025-04-30 PROCEDURE — 99214 OFFICE O/P EST MOD 30 MIN: CPT | Performed by: PHYSICIAN ASSISTANT

## 2025-04-30 RX ORDER — FAMOTIDINE 20 MG/1
20 TABLET, FILM COATED ORAL 2 TIMES DAILY
Qty: 180 TABLET | Refills: 0 | OUTPATIENT
Start: 2025-04-30

## 2025-04-30 RX ORDER — ROSUVASTATIN CALCIUM 5 MG/1
5 TABLET, COATED ORAL DAILY
Qty: 90 TABLET | Refills: 0 | Status: SHIPPED | OUTPATIENT
Start: 2025-04-30

## 2025-04-30 RX ORDER — FAMOTIDINE 20 MG/1
20 TABLET, FILM COATED ORAL 2 TIMES DAILY
Qty: 60 TABLET | Refills: 1 | Status: SHIPPED | OUTPATIENT
Start: 2025-04-30

## 2025-06-02 ENCOUNTER — HOSPITAL ENCOUNTER (OUTPATIENT)
Dept: ULTRASOUND IMAGING | Facility: HOSPITAL | Age: 55
Discharge: HOME | End: 2025-06-02
Admitting: PHYSICIAN ASSISTANT
Payer: COMMERCIAL

## 2025-06-02 DIAGNOSIS — R59.1 LYMPHADENOPATHY: ICD-10-CM

## 2025-06-02 DIAGNOSIS — E04.1 THYROID NODULE: ICD-10-CM

## 2025-06-02 PROCEDURE — 76536 US EXAM OF HEAD AND NECK: CPT

## 2025-06-05 ENCOUNTER — RESULTS FOLLOW-UP (OUTPATIENT)
Dept: FAMILY MEDICINE CLINIC | Facility: CLINIC | Age: 55
End: 2025-06-05
Payer: COMMERCIAL

## 2025-06-05 DIAGNOSIS — E04.2 MULTIPLE THYROID NODULES: Primary | ICD-10-CM

## 2025-06-06 NOTE — TELEPHONE ENCOUNTER
Patient informed and voiced understanding of radiology results.    Please notify patient of thyroid u/s results:  --thyroid u/s nodules are gradually increasing in size; recommend specialist referral  US Thyroid

## 2025-07-30 NOTE — PROGRESS NOTES
Chief Complaint  Chief Complaint   Patient presents with    Hypertension       Subjective          Carlos Mcgregor presents to [facility] annual physical and 3 mth f/u.    History of Present Illness    HTN: Patient presents for hypertension management. Patient is currently taking HCTZ, Losartan, and Metoprolol and is consistent with medication; bp well controlled today at 120/82.     Impaired glucose tolerance: started on Metformin 500mg daily at last visit; last vist A1c was 6.3; repeat A1c was 6.5 but patient states that he is sleeping better and feels better on Metformin.      HL: put on Crestor 5mg daily at last visit. LDL has improved.     Patient has h/o thyroid nodule 2019--bx showed benign follicular nodule and colloid nodule.  Patient states swelling in the left side of his neck; notes when shaving. Thyroid u/s 6/2/25 increasing in size; referred to Dr. Luna. No appt yet due to patient's schedule.     Patient also complains of cough after eating and what appears to be a spasm in his throat.  Patient has tried an inhaler in the past but was too expensive; noted some improvement.  Patient was previously on a PPI and was told he had an ulcer but is no longer taking that medication.  Patient questions if symptoms are psychosomatic as when he is working he has no cough and can talk for an extended period of time but once work stops, the cough restarts.  At last visit patient was instructed to try Pepcid 20 Mg twice daily; consider CT of the chest and referral to pulm if no improvement. Pepcid had no effect on cough.    Fatigue: Patient states fatigue is getting worse; states that he could sleep all day long, having no energy.  Labs checked to include testosterone which were normal.  Patient does have thyroid nodule, was referred to Dr. Luna at last visit.  Patient has not seen Dr. Luna yet due to his schedule.  Patient does admit to snoring.  Will refer to sleep medicine  "for sleep study.    Left shoulder pain: Patient complains of acute left shoulder pain that started in the past week after sleeping on hotel mattress.  Patient denies injury.  Patient states increased pain with movement or lifting.  Patient states soreness in left trap area.  Patient states he has been trying to stretch massage and lift weights and Aleve without improvement    Labs: 4/28/25  A1c same at 6.3  LDL improved with Crestor  triglycerides (sugary, starchy part of your cholesterol) are elevated; decrease sugars and carbs in diet  LFTs still elevated  Testosterone is normal.    Dental exam up to date: yes  Eye exam up to date: yes    Medical History: has a past medical history of Allergic rhinitis due to allergen (11/02/2020), Arthritis, Elevated glucose (11/02/2020), Elevated LFTs, Essential hypertension (11/02/2020), Fatty liver, Foot pain, left (09/27/2018), Foreign body in soft tissue (09/27/2018), GERD (gastroesophageal reflux disease) (11/02/2020), Heel pain, HLD (hyperlipidemia), Hypertension, Ingrowing toenail, and Numbness in feet.   Surgical History: has a past surgical history that includes US Guided Fine Needle Aspiration (09/09/2019); US Guided Fine Needle Aspiration (09/10/2019); Colonoscopy; Esophagogastroduodenoscopy; and Colonoscopy (N/A, 4/21/2022).   Family History: family history includes Diabetes in his maternal grandfather and mother; Heart disease in an other family member; Hypertension in an other family member.   Social History: reports that he has never smoked. He has never been exposed to tobacco smoke. He has never used smokeless tobacco. He reports current alcohol use. He reports that he does not use drugs.    Allergies: Latex    Health Maintenance Due   Topic Date Due    ANNUAL PHYSICAL  Never done       Objective     Vitals:    07/31/25 0913 07/31/25 0950   BP: 113/92 120/82   Pulse: 77    SpO2: 96%    Weight: 111 kg (245 lb 12.8 oz)    Height: 182.9 cm (72.01\")      Body mass " index is 33.33 kg/m².     Wt Readings from Last 3 Encounters:   07/31/25 111 kg (245 lb 12.8 oz)   04/30/25 115 kg (254 lb 9.6 oz)   02/26/25 116 kg (255 lb)     BP Readings from Last 3 Encounters:   07/31/25 120/82   04/30/25 128/84   02/26/25 116/90       Patient Care Team:  Dania Minor PA as PCP - General (Family Medicine)  Gege Dee PA-C as Consulting Physician (Dermatology)  Paolo Oliver MD as Consulting Physician (General Surgery)  Kathie Rodriguez APRN as Nurse Practitioner (Nurse Practitioner)    Physical Exam  Vitals and nursing note reviewed.   Constitutional:       Appearance: Normal appearance. He is obese.   HENT:      Head: Normocephalic and atraumatic.   Neck:      Vascular: No carotid bruit.      Comments: Thyroid : gland size normal, nontender, no nodules or masses present on palpation   Cardiovascular:      Rate and Rhythm: Normal rate and regular rhythm.      Pulses: Normal pulses.      Heart sounds: Normal heart sounds.   Pulmonary:      Effort: Pulmonary effort is normal.      Breath sounds: Normal breath sounds.   Musculoskeletal:      Left shoulder: Tenderness present. No swelling or deformity. Decreased range of motion. Normal strength.      Cervical back: Neck supple. No tenderness.      Right lower leg: No edema.      Left lower leg: No edema.   Lymphadenopathy:      Cervical: No cervical adenopathy.   Neurological:      Mental Status: He is alert.   Psychiatric:         Mood and Affect: Mood normal.         Behavior: Behavior normal.           Result Review :   POC Glycosylated Hemoglobin (Hb A1C) (07/31/2025 10:04)            Assessment and Plan      Diagnoses and all orders for this visit:    1. Annual physical exam (Primary)  -     Comprehensive Metabolic Panel; Future  -     Lipid Panel; Future  -     CBC & Differential; Future  -     TSH; Future    2. Hypertension, unspecified type  Comments:  Stable: Continue losartan 50 Mg daily, Toprol-XL 50 Mg  daily, HCTZ 25 Mg daily, check labs and f/u in 6mths.  Orders:  -     hydroCHLOROthiazide 25 MG tablet; Take 1 tablet by mouth Daily.  Dispense: 90 tablet; Refill: 1  -     losartan (COZAAR) 50 MG tablet; Take 1 tablet by mouth Daily.  Dispense: 90 tablet; Refill: 1  -     metoprolol succinate XL (TOPROL-XL) 50 MG 24 hr tablet; Take 1 tablet by mouth 2 (Two) Times a Day.  Dispense: 180 tablet; Refill: 1  -     Comprehensive Metabolic Panel; Future  -     Lipid Panel; Future  -     CBC & Differential; Future  -     TSH; Future    3. Prediabetes  Comments:  A1c higher: increase Metformin to 500mg twice daily with meals; continue to monitor diet; check labs and f/u in 6mths.  Orders:  -     metFORMIN (GLUCOPHAGE) 500 MG tablet; Take 1 tablet by mouth 2 (Two) Times a Day With Meals.  Dispense: 180 tablet; Refill: 1  -     Comprehensive Metabolic Panel; Future  -     POC Glycosylated Hemoglobin (Hb A1C)    4. Elevated LDL cholesterol level  Comments:  Improved with Crestor 5mg daily; continue.  Orders:  -     rosuvastatin (Crestor) 5 MG tablet; Take 1 tablet by mouth Daily.  Dispense: 90 tablet; Refill: 1  -     Comprehensive Metabolic Panel; Future  -     Lipid Panel; Future    5. Screening for prostate cancer  -     PSA SCREENING; Future    6. Chronic cough  Comments:  Further evaluate with CT chest; consider referral to pulmonology  Orders:  -     CT Chest With Contrast; Future    7. Other fatigue  -     Ambulatory Referral to Sleep Medicine    8. Snoring  -     Ambulatory Referral to Sleep Medicine    9. Acute pain of left shoulder  Comments:  Trial of Kenalog 40mg daily; continue stretching and ice.  Orders:  -     triamcinolone acetonide (KENALOG-40) injection 40 mg    The patient is advised to begin progressive daily aerobic exercise program, follow a low fat, low cholesterol diet, attempt to lose weight, improve dietary compliance, continue current medications, continue current healthy lifestyle patterns, and  return for routine annual checkups.        Follow Up     Return in about 6 months (around 1/31/2026).    Patient was given instructions and counseling regarding his condition or for health maintenance advice. Please see specific information pulled into the AVS if appropriate.

## 2025-07-31 ENCOUNTER — OFFICE VISIT (OUTPATIENT)
Dept: FAMILY MEDICINE CLINIC | Facility: CLINIC | Age: 55
End: 2025-07-31
Payer: COMMERCIAL

## 2025-07-31 VITALS
SYSTOLIC BLOOD PRESSURE: 120 MMHG | OXYGEN SATURATION: 96 % | DIASTOLIC BLOOD PRESSURE: 82 MMHG | BODY MASS INDEX: 33.29 KG/M2 | HEART RATE: 77 BPM | HEIGHT: 72 IN | WEIGHT: 245.8 LBS

## 2025-07-31 DIAGNOSIS — R53.83 OTHER FATIGUE: ICD-10-CM

## 2025-07-31 DIAGNOSIS — Z12.5 SCREENING FOR PROSTATE CANCER: ICD-10-CM

## 2025-07-31 DIAGNOSIS — Z00.00 ANNUAL PHYSICAL EXAM: Primary | ICD-10-CM

## 2025-07-31 DIAGNOSIS — E78.00 ELEVATED LDL CHOLESTEROL LEVEL: Chronic | ICD-10-CM

## 2025-07-31 DIAGNOSIS — R06.83 SNORING: ICD-10-CM

## 2025-07-31 DIAGNOSIS — I10 HYPERTENSION, UNSPECIFIED TYPE: Chronic | ICD-10-CM

## 2025-07-31 DIAGNOSIS — R73.03 PREDIABETES: Chronic | ICD-10-CM

## 2025-07-31 DIAGNOSIS — M25.512 ACUTE PAIN OF LEFT SHOULDER: ICD-10-CM

## 2025-07-31 DIAGNOSIS — R05.3 CHRONIC COUGH: Chronic | ICD-10-CM

## 2025-07-31 LAB
EXPIRATION DATE: ABNORMAL
HBA1C MFR BLD: 6.5 % (ref 4.5–5.7)
Lab: ABNORMAL

## 2025-07-31 RX ORDER — HYDROCHLOROTHIAZIDE 25 MG/1
25 TABLET ORAL DAILY
Qty: 90 TABLET | Refills: 1 | Status: SHIPPED | OUTPATIENT
Start: 2025-07-31

## 2025-07-31 RX ORDER — METOPROLOL SUCCINATE 50 MG/1
50 TABLET, EXTENDED RELEASE ORAL 2 TIMES DAILY
Qty: 180 TABLET | Refills: 1 | Status: SHIPPED | OUTPATIENT
Start: 2025-07-31

## 2025-07-31 RX ORDER — ROSUVASTATIN CALCIUM 5 MG/1
5 TABLET, COATED ORAL DAILY
Qty: 90 TABLET | Refills: 1 | Status: SHIPPED | OUTPATIENT
Start: 2025-07-31

## 2025-07-31 RX ORDER — TRIAMCINOLONE ACETONIDE 40 MG/ML
40 INJECTION, SUSPENSION INTRA-ARTICULAR; INTRAMUSCULAR ONCE
Status: COMPLETED | OUTPATIENT
Start: 2025-07-31 | End: 2025-07-31

## 2025-07-31 RX ORDER — LOSARTAN POTASSIUM 50 MG/1
50 TABLET ORAL DAILY
Qty: 90 TABLET | Refills: 1 | Status: SHIPPED | OUTPATIENT
Start: 2025-07-31

## 2025-07-31 RX ADMIN — TRIAMCINOLONE ACETONIDE 40 MG: 40 INJECTION, SUSPENSION INTRA-ARTICULAR; INTRAMUSCULAR at 10:06

## 2025-08-15 ENCOUNTER — HOSPITAL ENCOUNTER (OUTPATIENT)
Dept: CT IMAGING | Facility: HOSPITAL | Age: 55
Discharge: HOME | End: 2025-08-15
Admitting: PHYSICIAN ASSISTANT
Payer: COMMERCIAL

## 2025-08-15 DIAGNOSIS — R05.3 CHRONIC COUGH: Chronic | ICD-10-CM

## 2025-08-15 LAB
CREAT BLDA-MCNC: 1 MG/DL (ref 0.6–1.3)
EGFRCR SERPLBLD CKD-EPI 2021: 88.3 ML/MIN/1.73

## 2025-08-15 PROCEDURE — 82565 ASSAY OF CREATININE: CPT

## 2025-08-15 PROCEDURE — 71260 CT THORAX DX C+: CPT

## 2025-08-15 PROCEDURE — 25510000001 IOPAMIDOL PER 1 ML: Performed by: PHYSICIAN ASSISTANT

## 2025-08-15 RX ORDER — IOPAMIDOL 755 MG/ML
100 INJECTION, SOLUTION INTRAVASCULAR
Status: COMPLETED | OUTPATIENT
Start: 2025-08-15 | End: 2025-08-15

## 2025-08-15 RX ADMIN — IOPAMIDOL 100 ML: 755 INJECTION, SOLUTION INTRAVENOUS at 15:23

## (undated) DEVICE — GLV SURG SENSICARE PI LF PF 7.5 GRN STRL

## (undated) DEVICE — GLV SURG SENSICARE SLT PF LF 7 STRL

## (undated) DEVICE — COLON KIT: Brand: MEDLINE INDUSTRIES, INC.

## (undated) DEVICE — TUBING, SUCTION, 1/4" X 10', STRAIGHT: Brand: MEDLINE

## (undated) DEVICE — Device: Brand: DEFENDO AIR/WATER/SUCTION AND BIOPSY VALVE

## (undated) DEVICE — SINGLE-USE BIOPSY FORCEPS: Brand: RADIAL JAW 4

## (undated) DEVICE — SOL IRRG H2O PL/BG 1000ML STRL